# Patient Record
Sex: FEMALE | Race: WHITE | NOT HISPANIC OR LATINO | Employment: UNEMPLOYED | URBAN - METROPOLITAN AREA
[De-identification: names, ages, dates, MRNs, and addresses within clinical notes are randomized per-mention and may not be internally consistent; named-entity substitution may affect disease eponyms.]

---

## 2017-05-26 ENCOUNTER — TELEPHONE (OUTPATIENT)
Dept: OBSTETRICS AND GYNECOLOGY | Facility: CLINIC | Age: 37
End: 2017-05-26

## 2017-05-26 ENCOUNTER — OFFICE VISIT (OUTPATIENT)
Dept: OBSTETRICS AND GYNECOLOGY | Facility: CLINIC | Age: 37
End: 2017-05-26
Attending: OBSTETRICS & GYNECOLOGY
Payer: COMMERCIAL

## 2017-05-26 VITALS
HEIGHT: 65 IN | BODY MASS INDEX: 30.23 KG/M2 | WEIGHT: 181.44 LBS | SYSTOLIC BLOOD PRESSURE: 118 MMHG | DIASTOLIC BLOOD PRESSURE: 78 MMHG

## 2017-05-26 DIAGNOSIS — N76.4 ABSCESS OF VULVA: Primary | ICD-10-CM

## 2017-05-26 PROCEDURE — 99999 PR PBB SHADOW E&M-EST. PATIENT-LVL II: CPT | Mod: PBBFAC,,, | Performed by: OBSTETRICS & GYNECOLOGY

## 2017-05-26 PROCEDURE — 99213 OFFICE O/P EST LOW 20 MIN: CPT | Mod: S$GLB,,, | Performed by: OBSTETRICS & GYNECOLOGY

## 2017-05-26 RX ORDER — SULFAMETHOXAZOLE AND TRIMETHOPRIM 400; 80 MG/1; MG/1
1 TABLET ORAL 2 TIMES DAILY
Qty: 20 TABLET | Refills: 0 | Status: SHIPPED | OUTPATIENT
Start: 2017-05-26 | End: 2017-06-05

## 2017-05-26 NOTE — TELEPHONE ENCOUNTER
----- Message from Nguyen Saleh sent at 5/26/2017 12:55 PM CDT -----  Contact: Patient   x_ 1st Request  _ 2nd Request  _ 3rd Request    Who: YAN WATKINS [[id number]]    Why: Patient is calling in regards to her PCP doctor finding mass developing. Patient states she was advised to be seen by her GYN doctor today. Patient has concerns. Patient is needing to speak with staff.     What Number to Call Back: [phone]    When to Expect a call back: (Before the end of the day)  -- if call after 3:00 call back will be tomorrow.

## 2017-05-26 NOTE — PROGRESS NOTES
Chief Complaint   Patient presents with    Mass     Vulva lump       HPI:  Susan Anaya is a 36 y.o. female patient  who presents today for evaluation of a lump that she noted today while taking a shower.  She describes feeling a slightly tender lump inferior to labia / perianally on right.  No fever.  She has never noted a similar finding in the past.  Patient's last menstrual period was 2017 (exact date).    Past Medical History:   Diagnosis Date    Abnormal Pap smear     Abnormal Pap smear of vagina         Allergy     Asthma     h/o    Celiac disease        Past Surgical History:   Procedure Laterality Date     SECTION      X 2     PELVIC LAPAROSCOPY           ROS:  GENERAL: Feeling well overall.   SKIN: Reports lump near labia.   HEAD: Denies head injury or headache.   NODES: Denies enlarged lymph nodes.   CHEST: Denies chest pain or shortness of breath.   CARDIOVASCULAR: Denies palpitations or left sided chest pain.   ABDOMEN: No abdominal pain, nausea, vomiting or rectal bleeding.   URINARY: No dysuria or hematuria.  REPRODUCTIVE: See HPI.   BREASTS: Denies pain, lumps, or nipple discharge.   HEMATOLOGIC: No easy bruisability or excessive bleeding.   MUSCULOSKELETAL: Denies joint pain or swelling.   NEUROLOGIC: Denies syncope or weakness.   PSYCHIATRIC: Denies depression.    PE:   (chaperone present during entire exam)  APPEARANCE: Well nourished, well developed, in no acute distress.  VULVA: Normal female genitalia.  On right side, inferior no labia / perianally, 2 cm x 2 cm smooth mass deep in subcutaneous tissue- not amendable to I&D at this time.  Minimally tender.  No overlying erythema.        Diagnosis:  1. Abscess of vulva          PLAN:    Orders Placed This Encounter    sulfamethoxazole-trimethoprim 400-80mg (BACTRIM) 400-80 mg per tablet       Patient was counseled today on suspected vulvar / perianal abscess.  At this time, the lesion is deep in the  subcutaneous tissue and is not amendable to I&D.  She will begin Bactrim as well as warm soaks.  She understands that she may require I&D in the future.  Instructions, precautions, and warning signs were reviewed.  She understands need to contact us or go to the ER for worsening symptoms, fever, etc.  She will follow-up in 1 week for reevaluation.    Follow-up 1 week    Total time of visit: 15 minutes (counseling >50% of time)

## 2017-05-31 ENCOUNTER — PROCEDURE VISIT (OUTPATIENT)
Dept: OBSTETRICS AND GYNECOLOGY | Facility: CLINIC | Age: 37
End: 2017-05-31
Payer: COMMERCIAL

## 2017-05-31 ENCOUNTER — TELEPHONE (OUTPATIENT)
Dept: OBSTETRICS AND GYNECOLOGY | Facility: CLINIC | Age: 37
End: 2017-05-31

## 2017-05-31 VITALS
SYSTOLIC BLOOD PRESSURE: 132 MMHG | DIASTOLIC BLOOD PRESSURE: 82 MMHG | BODY MASS INDEX: 30.12 KG/M2 | WEIGHT: 180.75 LBS | HEIGHT: 65 IN

## 2017-05-31 DIAGNOSIS — N76.4 VULVAR ABSCESS: Primary | ICD-10-CM

## 2017-05-31 DIAGNOSIS — K61.0 ABSCESS, PERIANAL: ICD-10-CM

## 2017-05-31 PROCEDURE — 87070 CULTURE OTHR SPECIMN AEROBIC: CPT

## 2017-05-31 PROCEDURE — 10060 I&D ABSCESS SIMPLE/SINGLE: CPT | Mod: S$GLB,,, | Performed by: OBSTETRICS & GYNECOLOGY

## 2017-05-31 NOTE — PROCEDURES
Procedures       Incision and Drainage of Vulvar Abscess    Consent signed    Right vulvar/perineum abscess near perianal area - + induration and fluctuance noted.    Area prepped with betadine solution.    Skin injected with 1ml of 1% xylocaine    A 1 cm incision with scalpel performed over area of fluctuance    + copious pus/purulent fluid drained   Aerobic culture obtained.    1/4 inch gauze placed in abscess bed to facilitate continued drainage.    Local care instructions given by me    ER instructions given.     Patient to continue Bactrim    Follow-up:  2 days for evaluation.    Mac Gomes IV, MD

## 2017-05-31 NOTE — TELEPHONE ENCOUNTER
----- Message from Cecilia Gardner sent at 5/31/2017  8:38 AM CDT -----  Contact: Patient  X _1st Request  _  2nd Request  _  3rd Request    Who:YAN WATKINS [[id number]]    Why:patient states she was seen on last Friday for a infection and she states it has gotten worse and she needs to know what to do     What Number to Call Back:[phone]    When to Expect a call back: (Before the end of the day)   -- if call after 3:00 call back will be tomorrow.

## 2017-06-01 ENCOUNTER — TELEPHONE (OUTPATIENT)
Dept: OBSTETRICS AND GYNECOLOGY | Facility: CLINIC | Age: 37
End: 2017-06-01

## 2017-06-01 NOTE — TELEPHONE ENCOUNTER
Patient states feeling better, reports flu symptoms have subsided. Patient scheduled tomorrow at 11am

## 2017-06-02 ENCOUNTER — OFFICE VISIT (OUTPATIENT)
Dept: OBSTETRICS AND GYNECOLOGY | Facility: CLINIC | Age: 37
End: 2017-06-02
Payer: COMMERCIAL

## 2017-06-02 VITALS
DIASTOLIC BLOOD PRESSURE: 80 MMHG | WEIGHT: 181.69 LBS | HEIGHT: 65 IN | SYSTOLIC BLOOD PRESSURE: 111 MMHG | BODY MASS INDEX: 30.27 KG/M2

## 2017-06-02 DIAGNOSIS — N76.4 VULVAR ABSCESS: Primary | ICD-10-CM

## 2017-06-02 PROCEDURE — 99999 PR PBB SHADOW E&M-EST. PATIENT-LVL II: CPT | Mod: PBBFAC,,, | Performed by: OBSTETRICS & GYNECOLOGY

## 2017-06-02 PROCEDURE — 99024 POSTOP FOLLOW-UP VISIT: CPT | Mod: S$GLB,,, | Performed by: OBSTETRICS & GYNECOLOGY

## 2017-06-02 NOTE — PROGRESS NOTES
CC:   Vulvar abscess    HPI:  Susan Anaya is a 36 y.o. female patient  who presents today for follow-up from I&D of abscess.  Patient reports continued discomfort - but improved from 2017 (I&D date).  Patient denies fever, chills, nausea, or emesis.       Patient's last menstrual period was 2017 (exact date).    Past Medical History:   Diagnosis Date    Abnormal Pap smear     Abnormal Pap smear of vagina         Allergy     Asthma     h/o    Celiac disease        Past Surgical History:   Procedure Laterality Date     SECTION      X 2     PELVIC LAPAROSCOPY           ROS:  GENERAL: Feeling well overall.   SKIN: Denies rash or lesions.   HEAD: Denies head injury or headache.   NODES: Denies enlarged lymph nodes.   CHEST: Denies chest pain or shortness of breath.   CARDIOVASCULAR: Denies palpitations or left sided chest pain.   ABDOMEN: No abdominal pain, nausea, vomiting or rectal bleeding.   URINARY: No dysuria, hematuria, or burning on urination.  REPRODUCTIVE: See HPI.   BREASTS: Denies pain, lumps, or nipple discharge.   HEMATOLOGIC: No easy bruisability or excessive bleeding.   MUSCULOSKELETAL: Denies joint pain or swelling.   NEUROLOGIC: Denies syncope or weakness.   PSYCHIATRIC: Denies depression.    PE:   APPEARANCE: Well nourished, well developed, in no acute distress.  VULVA: No lesions. Normal female genitalia.  Right perineum abscess s/p I&D- 1/ gauze in place.  + drainage of purulent fluid, though minimal. + induration (improved from ).  No erythema (improved from )  URETHRAL MEATUS: Normal size and location, no lesions, no prolapse.  URETHRA: No masses, tenderness, prolapse or scarring.      Diagnosis:  1. Vulvar abscess      PLAN:    1/4 inch gauze removed and replaced.      Local care instructions given    Patient to continue Bactrim x 10 days total    Follow-up: Evaluate on Monday    Mac Gomes IV, MD

## 2017-06-03 LAB — BACTERIA SPEC AEROBE CULT: NORMAL

## 2017-06-05 ENCOUNTER — OFFICE VISIT (OUTPATIENT)
Dept: OBSTETRICS AND GYNECOLOGY | Facility: CLINIC | Age: 37
End: 2017-06-05
Payer: COMMERCIAL

## 2017-06-05 VITALS
WEIGHT: 182.13 LBS | DIASTOLIC BLOOD PRESSURE: 80 MMHG | SYSTOLIC BLOOD PRESSURE: 120 MMHG | HEIGHT: 65 IN | BODY MASS INDEX: 30.34 KG/M2

## 2017-06-05 DIAGNOSIS — N76.4 VULVAR ABSCESS: Primary | ICD-10-CM

## 2017-06-05 PROCEDURE — 99024 POSTOP FOLLOW-UP VISIT: CPT | Mod: S$GLB,,, | Performed by: OBSTETRICS & GYNECOLOGY

## 2017-06-05 PROCEDURE — 99999 PR PBB SHADOW E&M-EST. PATIENT-LVL II: CPT | Mod: PBBFAC,,, | Performed by: OBSTETRICS & GYNECOLOGY

## 2017-06-05 NOTE — PROGRESS NOTES
CC:  Right vulvar abscess    HPI:  Susan Anaya is a 36 y.o. female patient  who presents today for follow-up - I&D of abscess 5 days ago.  Patient has completed bactrim and reports significant improvement in pain.  No fever, chills, nausea, or emesis.  Minimal drainage reported.  The 1/4 inch gauze fell out on Saturday.    Patient's last menstrual period was 2017 (exact date).    Past Medical History:   Diagnosis Date    Abnormal Pap smear     Abnormal Pap smear of vagina         Allergy     Asthma     h/o    Celiac disease        Past Surgical History:   Procedure Laterality Date     SECTION      X 2     PELVIC LAPAROSCOPY           ROS:  GENERAL: Feeling well overall.   SKIN: Denies rash or lesions.   HEAD: Denies head injury or headache.   NODES: Denies enlarged lymph nodes.   CHEST: Denies chest pain or shortness of breath.   CARDIOVASCULAR: Denies palpitations or left sided chest pain.   ABDOMEN: No abdominal pain, nausea, vomiting or rectal bleeding.   URINARY: No dysuria, hematuria, or burning on urination.  REPRODUCTIVE: See HPI.   BREASTS: Denies pain, lumps, or nipple discharge.   HEMATOLOGIC: No easy bruisability or excessive bleeding.   MUSCULOSKELETAL: Denies joint pain or swelling.   NEUROLOGIC: Denies syncope or weakness.   PSYCHIATRIC: Denies depression.    PE:   APPEARANCE: Well nourished, well developed, in no acute distress.  ABDOMEN: Soft. No tenderness or masses. No hernias. No CVA tenderness.  VULVA: Normal female genitalia.  Right perineum/perianal abscess healing well.  Induration significantly improved and no erythema.  No significant drainage today.  URETHRAL MEATUS: Normal size and location, no lesions, no prolapse.  URETHRA: No masses, tenderness, prolapse or scarring.  ANUS PERINEUM: Normal.  See above    Diagnosis:  1. Vulvar abscess          PLAN:    Patient was counseled today on findings.  No new abx erx done    Continue local care    If recurrence  of abscess/pain/erythema noted, patient instructed to contact office.    Follow-up:  Prn/Annual exam  Mca Gomes IV, MD

## 2017-07-09 ENCOUNTER — PATIENT MESSAGE (OUTPATIENT)
Dept: OBSTETRICS AND GYNECOLOGY | Facility: CLINIC | Age: 37
End: 2017-07-09

## 2017-07-10 ENCOUNTER — OFFICE VISIT (OUTPATIENT)
Dept: OBSTETRICS AND GYNECOLOGY | Facility: CLINIC | Age: 37
End: 2017-07-10
Payer: COMMERCIAL

## 2017-07-10 VITALS
WEIGHT: 179.25 LBS | DIASTOLIC BLOOD PRESSURE: 86 MMHG | SYSTOLIC BLOOD PRESSURE: 128 MMHG | HEIGHT: 65 IN | BODY MASS INDEX: 29.87 KG/M2

## 2017-07-10 DIAGNOSIS — N76.4 VULVAR ABSCESS: Primary | ICD-10-CM

## 2017-07-10 PROCEDURE — 99999 PR PBB SHADOW E&M-EST. PATIENT-LVL II: CPT | Mod: PBBFAC,,, | Performed by: NURSE PRACTITIONER

## 2017-07-10 PROCEDURE — 87070 CULTURE OTHR SPECIMN AEROBIC: CPT

## 2017-07-10 PROCEDURE — 99213 OFFICE O/P EST LOW 20 MIN: CPT | Mod: S$GLB,,, | Performed by: NURSE PRACTITIONER

## 2017-07-10 RX ORDER — SULFAMETHOXAZOLE AND TRIMETHOPRIM 800; 160 MG/1; MG/1
1 TABLET ORAL 2 TIMES DAILY
Qty: 20 TABLET | Refills: 0 | Status: SHIPPED | OUTPATIENT
Start: 2017-07-10 | End: 2017-07-20

## 2017-07-10 NOTE — PROGRESS NOTES
"  CC: F/U abscess     HPI: Pt "NIEVES" is a 36 y.o.  female who presents for a f/u vulvar abscess with I&D on 5/3/17.  Reports she finished her antibiotics.   Reports last night she manually drained the abscess as it had refilled with pus.  Denies any associated pain, bleeding or fever.     ROS:  GENERAL: Feeling well overall. Denies fever or chills.   SKIN: Denies rash or lesions.   HEAD: Denies head injury or headache.   NODES: Denies enlarged lymph nodes.   CHEST: Denies chest pain or shortness of breath.   CARDIOVASCULAR: Denies palpitations or left sided chest pain.   ABDOMEN: No abdominal pain, constipation, diarrhea, nausea, vomiting or rectal bleeding.   URINARY: No dysuria, hematuria, or burning on urination.  REPRODUCTIVE: See HPI.   BREASTS: Denies pain, lumps, or nipple discharge.   HEMATOLOGIC: No easy bruisability or excessive bleeding.   MUSCULOSKELETAL: Denies joint pain or swelling.   NEUROLOGIC: Denies syncope or weakness.   PSYCHIATRIC: Denies depression, anxiety or mood swings.  PE:   APPEARANCE: Well nourished, well developed, Black or  female in no acute distress.  VULVA: No lesions. Normal external female genitalia.  Right perineum/perianal abscess with small area of purulent drainage- aerobic culture obtained. No tenderness, erythema, fluctuance, or induration.  URETHRAL MEATUS: Normal size and location, no lesions, no prolapse.  URETHRA: No masses, tenderness, or prolapse.  VAGINA: Moist. No lesions or lacerations noted. No abnormal discharge present. No odor present.   CERVIX: No lesions or discharge. No cervical motion tenderness.   UTERUS: Normal size, regular shape, mobile, non-tender.  ADNEXA: No tenderness. No fullness or masses palpated in the adnexal regions.   ANUS PERINEUM: Normal.    Diagnosis:  1. Vulvar abscess        Plan:     Orders Placed This Encounter    CULTURE, AEROBIC  (SPECIFY SOURCE)    sulfamethoxazole-trimethoprim 800-160mg (BACTRIM DS) 800-160 mg " Tab     Aerobic culture  Bactrim  Discussed to avoid mechanical irritation from tight fighting clothing. Discussed good perineal hygiene.  Discussed topical application of Witch hazel to soothe the skin, reduces pain and swelling, and shrinks the abscesses.   Instructed to notify clinic if experiences a recurrent abscess or fever.      Follow-up PRN no resolution of symptoms    Deepti Recio, AUDRAP-C

## 2017-07-12 LAB — BACTERIA SPEC AEROBE CULT: NORMAL

## 2017-07-23 ENCOUNTER — PATIENT MESSAGE (OUTPATIENT)
Dept: OBSTETRICS AND GYNECOLOGY | Facility: CLINIC | Age: 37
End: 2017-07-23

## 2017-07-25 ENCOUNTER — TELEPHONE (OUTPATIENT)
Dept: OBSTETRICS AND GYNECOLOGY | Facility: CLINIC | Age: 37
End: 2017-07-25

## 2017-07-25 NOTE — TELEPHONE ENCOUNTER
Patient C/O abscess filling again. And wants to know what's the next step. Patient stated that she is going out of town and will be back in own August 5. And wants to know what Dr. Gomes recommend. I advised patient that I will forward message to dr. Gomes. Patient verbalized understanding.

## 2017-07-25 NOTE — TELEPHONE ENCOUNTER
Spoke to patient regarding the abscess she has been dealing with. Patient reports abscess continues to fill and she has been manually draining the abscess. Patient has taken two rounds of Bactrim. Patient reports the area no longer hurts. However, it is not improving. Patient reports she is still doing warm compresses and which tristen. Patient has also tried rubbing alcohol to the affected area. Patient denies fever/chills at this time. Informed patient, will discuss with Dr. Eliseo ROSAS regarding next steps and will follow up. Instructed the patient to contact the office or report to the ER is she begins to experience fever/chills. Patient verbalized understanding.

## 2017-07-25 NOTE — TELEPHONE ENCOUNTER
----- Message from Shreya Kimbrough sent at 7/25/2017 10:45 AM CDT -----  Contact: Self  Pt is calling in regards of her abscess. The pt can be reached at  [phone]. Thanks kG

## 2017-07-25 NOTE — TELEPHONE ENCOUNTER
Informed Susan I consulted with Dr. Eliseo ROSAS. I provided Dr. Eliseo ROSAS with the current status of her abscess. Patient denies fever/chills, abscess is continuing to drain. Dr. Eliseo ROSAS stated drainage at this point is to be expected with the size of the abscess Susan had. Dr. Eliseo ROSAS wants Susan to stop manually draining the abscess and let it drain on its own so it can heal from the inside out. Dr. Eliseo ROSAS wants Susan to continue with warm compresses and witch hazel. Instructed Susan to continue monitoring for the next 2-3 days without draining the abscess and to call the office if she begins to have discomfort/pain, fever, or chills. Patient verbalized understanding.

## 2017-08-01 ENCOUNTER — TELEPHONE (OUTPATIENT)
Dept: OBSTETRICS AND GYNECOLOGY | Facility: CLINIC | Age: 37
End: 2017-08-01

## 2017-08-01 NOTE — TELEPHONE ENCOUNTER
----- Message from Alea Lord sent at 8/1/2017  4:13 PM CDT -----  Contact: YAN WATKINS [[id number]]  x_  1st Request  _  2nd Request  _  3rd Request        Who: YAN WATKINS [[id number]]    Why: Patient states she would like a call back in regards to her cyst turning into a fistula and would advise on what to do    What Number to Call Back: [phone]    When to Expect a call back: (Before the end of the day)   -- if call after 3:00 call back will be tomorrow.

## 2017-08-01 NOTE — TELEPHONE ENCOUNTER
Patient reports when pressed down on bump, and d/c came out of anus. Patient currently out of town, will check with Dr. Gomes if needs sooner appointment, then 8/8/2017 for evaluation. Informed will check with patient tomorrow regarding condition

## 2017-08-02 NOTE — TELEPHONE ENCOUNTER
----- Message from Alea Lord sent at 8/2/2017 12:26 PM CDT -----  Contact: YAN WATKINS [[id number]]  x_  1st Request  _  2nd Request  _  3rd Request        Who: YAN WATKINS [[id number]]    Why: patient states she would like to speak with Arlene again in regards to her abscess     What Number to Call Back: [phone]    When to Expect a call back: (Before the end of the day)   -- if call after 3:00 call back will be tomorrow.

## 2017-08-08 ENCOUNTER — OFFICE VISIT (OUTPATIENT)
Dept: OBSTETRICS AND GYNECOLOGY | Facility: CLINIC | Age: 37
End: 2017-08-08
Payer: COMMERCIAL

## 2017-08-08 VITALS
HEIGHT: 65 IN | DIASTOLIC BLOOD PRESSURE: 90 MMHG | BODY MASS INDEX: 30.12 KG/M2 | WEIGHT: 180.75 LBS | SYSTOLIC BLOOD PRESSURE: 122 MMHG

## 2017-08-08 DIAGNOSIS — N76.4 VULVAR ABSCESS: Primary | ICD-10-CM

## 2017-08-08 PROCEDURE — 99213 OFFICE O/P EST LOW 20 MIN: CPT | Mod: S$GLB,,, | Performed by: OBSTETRICS & GYNECOLOGY

## 2017-08-08 PROCEDURE — 99999 PR PBB SHADOW E&M-EST. PATIENT-LVL II: CPT | Mod: PBBFAC,,, | Performed by: OBSTETRICS & GYNECOLOGY

## 2017-08-08 PROCEDURE — 87186 SC STD MICRODIL/AGAR DIL: CPT

## 2017-08-08 PROCEDURE — 87070 CULTURE OTHR SPECIMN AEROBIC: CPT

## 2017-08-08 PROCEDURE — 3008F BODY MASS INDEX DOCD: CPT | Mod: S$GLB,,, | Performed by: OBSTETRICS & GYNECOLOGY

## 2017-08-08 PROCEDURE — 87077 CULTURE AEROBIC IDENTIFY: CPT

## 2017-08-08 NOTE — PROGRESS NOTES
"HPI:  Abscess drainage    HPI:.Susan Anaya is a 37 y.o. female patient  who presents today with continued drainage from an abscess that was drained 2017.  Patient reports continued swelling and collection of purulent material - drains after compression.  The fluid then collects again with no decrease in output. Patient denies significant discomfort, fever, or chills.      Patient does report possible drainage from "rectum" a week ago x 1.  Patient is unclear if this is so but has read about fistulae and is concerned.  No rectal drainage reported over past week.    Patient's last menstrual period was 2017.    Past Medical History:   Diagnosis Date    Abnormal Pap smear     Abnormal Pap smear of vagina         Allergy     Asthma     h/o    Celiac disease        Past Surgical History:   Procedure Laterality Date     SECTION      X 2     PELVIC LAPAROSCOPY           ROS:  GENERAL: Feeling well overall.   SKIN: Denies rash or lesions.   HEAD: Denies head injury or headache.   NODES: Denies enlarged lymph nodes.   CHEST: Denies chest pain or shortness of breath.   CARDIOVASCULAR: Denies palpitations or left sided chest pain.   ABDOMEN: No abdominal pain, nausea, vomiting or rectal bleeding.   URINARY: No dysuria, hematuria, or burning on urination.  REPRODUCTIVE: See HPI.   BREASTS: Denies pain, lumps, or nipple discharge.   HEMATOLOGIC: No easy bruisability or excessive bleeding.   MUSCULOSKELETAL: Denies joint pain or swelling.   NEUROLOGIC: Denies syncope or weakness.   PSYCHIATRIC: Denies depression.    PE:   APPEARANCE: Well nourished, well developed, in no acute distress.  ABDOMEN: Soft. No tenderness or masses. No hernias. No CVA tenderness.  VULVA: Normal female genitalia.  Right perineum opening with purulent drainage today.  No pain and minimal induration on palpation.  URETHRAL MEATUS: Normal size and location, no lesions, no prolapse.  URETHRA: No masses, tenderness, " prolapse or scarring.  VAGINA: Moist and well rugated, no discharge, no significant cystocele or rectocele.  CERVIX: No lesions and discharge.   UTERUS: Normal size, regular shape, mobile, non-tender, bladder base nontender.  ADNEXA: No masses, tenderness or CDS nodularity.  ANUS/RECTAL:  No extension of induration or abnormality noted on rectal exam.    Diagnosis:  1. Vulvar abscess - chronic          PLAN:    Culture of abscess done.    Patient was counseled today on findings.  Consents were signed for repair I&D, resection/packing of area in OR    Discussed possible CRS follow-up if no resolution    OR to be scheduled over next month    Mac Gomes IV, MD

## 2017-08-09 ENCOUNTER — TELEPHONE (OUTPATIENT)
Dept: OBSTETRICS AND GYNECOLOGY | Facility: CLINIC | Age: 37
End: 2017-08-09

## 2017-08-09 DIAGNOSIS — N76.4 VULVAR ABSCESS: Primary | ICD-10-CM

## 2017-08-12 LAB
BACTERIA SPEC AEROBE CULT: NORMAL
BACTERIA SPEC AEROBE CULT: NORMAL

## 2017-08-17 ENCOUNTER — HOSPITAL ENCOUNTER (OUTPATIENT)
Dept: PREADMISSION TESTING | Facility: OTHER | Age: 37
Discharge: HOME | End: 2017-08-17
Attending: OBSTETRICS & GYNECOLOGY
Payer: COMMERCIAL

## 2017-08-17 ENCOUNTER — ANESTHESIA EVENT (OUTPATIENT)
Dept: SURGERY | Facility: OTHER | Age: 37
End: 2017-08-17
Payer: COMMERCIAL

## 2017-08-17 VITALS
TEMPERATURE: 99 F | HEIGHT: 65 IN | SYSTOLIC BLOOD PRESSURE: 134 MMHG | OXYGEN SATURATION: 100 % | DIASTOLIC BLOOD PRESSURE: 74 MMHG | BODY MASS INDEX: 29.49 KG/M2 | WEIGHT: 177 LBS | HEART RATE: 62 BPM

## 2017-08-17 RX ORDER — SCOLOPAMINE TRANSDERMAL SYSTEM 1 MG/1
1 PATCH, EXTENDED RELEASE TRANSDERMAL
Status: CANCELLED | OUTPATIENT
Start: 2017-08-17

## 2017-08-17 RX ORDER — SODIUM CHLORIDE, SODIUM LACTATE, POTASSIUM CHLORIDE, CALCIUM CHLORIDE 600; 310; 30; 20 MG/100ML; MG/100ML; MG/100ML; MG/100ML
INJECTION, SOLUTION INTRAVENOUS CONTINUOUS
Status: CANCELLED | OUTPATIENT
Start: 2017-08-17

## 2017-08-17 RX ORDER — MIDAZOLAM HYDROCHLORIDE 5 MG/ML
5 INJECTION INTRAMUSCULAR; INTRAVENOUS
Status: CANCELLED | OUTPATIENT
Start: 2017-08-17 | End: 2017-08-17

## 2017-08-17 RX ORDER — ACETAMINOPHEN 325 MG/1
325 TABLET ORAL EVERY 6 HOURS PRN
COMMUNITY
End: 2017-10-30

## 2017-08-17 NOTE — ANESTHESIA PREPROCEDURE EVALUATION
08/17/2017  Susan Anaya is a 37 y.o., female.    Anesthesia Evaluation    I have reviewed the Patient Summary Reports.    I have reviewed the Nursing Notes.   I have reviewed the Medications.     Review of Systems  Anesthesia Hx:  No problems with previous Anesthesia  Denies Family Hx of Anesthesia complications.  Personal Hx of Anesthesia complications, Post-Operative Nausea/Vomiting   Social:  Non-Smoker    Hematology/Oncology:  Hematology Normal   Oncology Normal     EENT/Dental:EENT/Dental Normal   Cardiovascular:  Cardiovascular Normal Exercise tolerance: good     Pulmonary:  Pulmonary Normal    Renal/:  Renal/ Normal     Musculoskeletal:  Musculoskeletal Normal    Neurological:  Neurology Normal    Endocrine:  Endocrine Normal    Dermatological:  Skin Normal    Psych:  Psychiatric Normal           Physical Exam  General:  Well nourished    Airway/Jaw/Neck:  Airway Findings: Mouth Opening: Normal Tongue: Normal  General Airway Assessment: Adult  Mallampati: I  TM Distance: Normal, at least 6 cm  Jaw/Neck Findings:  Neck ROM: Normal ROM      Dental:  Dental Findings: In tact             Anesthesia Plan  Type of Anesthesia, risks & benefits discussed:  Anesthesia Type:  general, MAC  Patient's Preference:   Intra-op Monitoring Plan:   Intra-op Monitoring Plan Comments:   Post Op Pain Control Plan:   Post Op Pain Control Plan Comments:   Induction:   IV  Beta Blocker:         Informed Consent: Patient understands risks and agrees with Anesthesia plan.  Questions answered. Anesthesia consent signed with patient.  ASA Score: 1     Day of Surgery Review of History & Physical:    H&P update referred to the surgeon.         Ready For Surgery From Anesthesia Perspective.

## 2017-08-17 NOTE — DISCHARGE INSTRUCTIONS
PRE-ADMIT TESTING -  [phone]    [address]  [facility]        OUTPATIENT SURGERY UNIT - [phone]    Your surgery has been scheduled at [facility]. We are pleased to have the opportunity to serve you. For Further Information please call [phone].    On the day of surgery please report to the Information Desk on the 1st floor.    · CONTACT YOUR PHYSICIAN'S OFFICE THE DAY PRIOR TO YOUR SURGERY TO OBTAIN YOUR ARRIVAL TIME.     · The evening before surgery do not eat anything after 9 p.m. ( this includes hard candy, chewing gum and mints).  You may only have GATORADE, POWERADE AND WATER  from 9 p.m. until you leave your home.   DO NOT DRINK ANY LIQUIDS ON THE WAY TO THE HOSPITAL.      SPECIAL MEDICATION INSTRUCTIONS: TAKE medications checked off by the Anesthesiologist on your Medication List.    Angiogram Patients: Take medications as instructed by your physician, including aspirin.     Surgery Patients:    If you take ASPIRIN - Your PHYSICIAN/SURGEON will need to inform you IF/OR when you need to stop taking aspirin prior to your surgery.     Do Not take any medications containing IBUPROFEN.  Do Not Wear any make-up or dark nail polish   (especially eye make-up) to surgery. If you come to surgery with makeup on you will be required to remove the makeup or nail polish.    Do not shave your surgical area at least 5 days prior to your surgery. The surgical prep will be performed at the hospital according to Infection Control regulations.    Leave all valuables at home.   Do Not wear any jewelry or watches, including any metal in body piercings.  Contact Lens must be removed before surgery. Either do not wear the contact lens or bring a case and solution for storage.  Please bring a container for eyeglasses or dentures as required.  Bring any paperwork your physician has provided, such as consent forms,  history and physicals, doctor's orders, etc.   Bring comfortable clothes  that are loose fitting to wear upon discharge. Take into consideration the type of surgery being performed.  Maintain your diet as advised per your physician the day prior to surgery.      Adequate rest the night before surgery is advised.   Park in the Parking lot behind the hospital or in the [facility] across the street from the parking lot. Parking is complimentary.  If you will be discharged the same day as your procedure, please arrange for a responsible adult to drive you home or to accompany you if traveling by taxi.   YOU WILL NOT BE PERMITTED TO DRIVE OR TO LEAVE THE HOSPITAL ALONE AFTER SURGERY.   It is strongly recommended that you arrange for someone to remain with you for the first 24 hrs following your surgery.       Thank you for your cooperation.  The Staff of [facility].        Bathing Instructions                                                                 Please shower the evening before and morning of your procedure with    ANTIBACTERIAL SOAP. ( DIAL, etc )  Concentrate on the surgical area   for at least 3 minutes and rinse completely. Dry off as usual.   Do not use any deodorant, powder, body lotions, perfume, after shave or    cologne.

## 2017-08-21 ENCOUNTER — HOSPITAL ENCOUNTER (OUTPATIENT)
Facility: OTHER | Age: 37
Discharge: HOME | End: 2017-08-21
Attending: OBSTETRICS & GYNECOLOGY | Admitting: OBSTETRICS & GYNECOLOGY
Payer: COMMERCIAL

## 2017-08-21 ENCOUNTER — ANESTHESIA (OUTPATIENT)
Dept: SURGERY | Facility: OTHER | Age: 37
End: 2017-08-21
Payer: COMMERCIAL

## 2017-08-21 VITALS
DIASTOLIC BLOOD PRESSURE: 78 MMHG | HEIGHT: 65 IN | BODY MASS INDEX: 29.49 KG/M2 | HEART RATE: 64 BPM | OXYGEN SATURATION: 100 % | TEMPERATURE: 98 F | SYSTOLIC BLOOD PRESSURE: 119 MMHG | RESPIRATION RATE: 16 BRPM | WEIGHT: 177 LBS

## 2017-08-21 DIAGNOSIS — Z98.890 STATUS POST INCISION AND DRAINAGE: Primary | ICD-10-CM

## 2017-08-21 DIAGNOSIS — N76.4 VULVAR ABSCESS: ICD-10-CM

## 2017-08-21 LAB
B-HCG UR QL: NEGATIVE
CTP QC/QA: YES

## 2017-08-21 PROCEDURE — 88304 TISSUE EXAM BY PATHOLOGIST: CPT | Mod: 26,,, | Performed by: PATHOLOGY

## 2017-08-21 PROCEDURE — 71000016 HC POSTOP RECOV ADDL HR: Performed by: OBSTETRICS & GYNECOLOGY

## 2017-08-21 PROCEDURE — 63600175 PHARM REV CODE 636 W HCPCS: Performed by: OBSTETRICS & GYNECOLOGY

## 2017-08-21 PROCEDURE — C9290 INJ, BUPIVACAINE LIPOSOME: HCPCS | Performed by: OBSTETRICS & GYNECOLOGY

## 2017-08-21 PROCEDURE — 63600175 PHARM REV CODE 636 W HCPCS: Performed by: ANESTHESIOLOGY

## 2017-08-21 PROCEDURE — 63600175 PHARM REV CODE 636 W HCPCS: Performed by: NURSE ANESTHETIST, CERTIFIED REGISTERED

## 2017-08-21 PROCEDURE — 36000704 HC OR TIME LEV I 1ST 15 MIN: Performed by: OBSTETRICS & GYNECOLOGY

## 2017-08-21 PROCEDURE — 25000003 PHARM REV CODE 250: Performed by: ANESTHESIOLOGY

## 2017-08-21 PROCEDURE — 71000015 HC POSTOP RECOV 1ST HR: Performed by: OBSTETRICS & GYNECOLOGY

## 2017-08-21 PROCEDURE — 36000705 HC OR TIME LEV I EA ADD 15 MIN: Performed by: OBSTETRICS & GYNECOLOGY

## 2017-08-21 PROCEDURE — 56405 I&D VULVA/PERINEAL ABSCESS: CPT | Mod: ,,, | Performed by: OBSTETRICS & GYNECOLOGY

## 2017-08-21 PROCEDURE — 88304 TISSUE EXAM BY PATHOLOGIST: CPT | Performed by: PATHOLOGY

## 2017-08-21 PROCEDURE — 81025 URINE PREGNANCY TEST: CPT | Performed by: ANESTHESIOLOGY

## 2017-08-21 PROCEDURE — 37000008 HC ANESTHESIA 1ST 15 MINUTES: Performed by: OBSTETRICS & GYNECOLOGY

## 2017-08-21 PROCEDURE — 71000033 HC RECOVERY, INTIAL HOUR: Performed by: OBSTETRICS & GYNECOLOGY

## 2017-08-21 PROCEDURE — 37000009 HC ANESTHESIA EA ADD 15 MINS: Performed by: OBSTETRICS & GYNECOLOGY

## 2017-08-21 RX ORDER — SODIUM CHLORIDE 0.9 % (FLUSH) 0.9 %
3 SYRINGE (ML) INJECTION
Status: DISCONTINUED | OUTPATIENT
Start: 2017-08-21 | End: 2017-08-21 | Stop reason: HOSPADM

## 2017-08-21 RX ORDER — ONDANSETRON 8 MG/1
8 TABLET, ORALLY DISINTEGRATING ORAL EVERY 8 HOURS PRN
Qty: 20 TABLET | Refills: 0 | Status: SHIPPED | OUTPATIENT
Start: 2017-08-21 | End: 2017-09-19

## 2017-08-21 RX ORDER — MEPERIDINE HYDROCHLORIDE 50 MG/ML
12.5 INJECTION INTRAMUSCULAR; INTRAVENOUS; SUBCUTANEOUS ONCE AS NEEDED
Status: DISCONTINUED | OUTPATIENT
Start: 2017-08-21 | End: 2017-08-21 | Stop reason: HOSPADM

## 2017-08-21 RX ORDER — DIPHENHYDRAMINE HCL 25 MG
25 CAPSULE ORAL EVERY 4 HOURS PRN
Status: DISCONTINUED | OUTPATIENT
Start: 2017-08-21 | End: 2017-08-21 | Stop reason: HOSPADM

## 2017-08-21 RX ORDER — DIPHENHYDRAMINE HYDROCHLORIDE 50 MG/ML
25 INJECTION INTRAMUSCULAR; INTRAVENOUS EVERY 4 HOURS PRN
Status: DISCONTINUED | OUTPATIENT
Start: 2017-08-21 | End: 2017-08-21 | Stop reason: HOSPADM

## 2017-08-21 RX ORDER — SULFAMETHOXAZOLE AND TRIMETHOPRIM 800; 160 MG/1; MG/1
1 TABLET ORAL 2 TIMES DAILY
Qty: 20 TABLET | Refills: 0 | Status: SHIPPED | OUTPATIENT
Start: 2017-08-21 | End: 2017-08-31

## 2017-08-21 RX ORDER — PROMETHAZINE HYDROCHLORIDE 25 MG/1
25 TABLET ORAL EVERY 6 HOURS PRN
Status: DISCONTINUED | OUTPATIENT
Start: 2017-08-21 | End: 2017-08-21 | Stop reason: HOSPADM

## 2017-08-21 RX ORDER — LIDOCAINE HCL/PF 100 MG/5ML
SYRINGE (ML) INTRAVENOUS
Status: DISCONTINUED | OUTPATIENT
Start: 2017-08-21 | End: 2017-08-21

## 2017-08-21 RX ORDER — SCOLOPAMINE TRANSDERMAL SYSTEM 1 MG/1
1 PATCH, EXTENDED RELEASE TRANSDERMAL
Status: DISCONTINUED | OUTPATIENT
Start: 2017-08-21 | End: 2017-08-21 | Stop reason: HOSPADM

## 2017-08-21 RX ORDER — SODIUM CHLORIDE, SODIUM LACTATE, POTASSIUM CHLORIDE, CALCIUM CHLORIDE 600; 310; 30; 20 MG/100ML; MG/100ML; MG/100ML; MG/100ML
INJECTION, SOLUTION INTRAVENOUS CONTINUOUS
Status: DISCONTINUED | OUTPATIENT
Start: 2017-08-21 | End: 2017-08-21 | Stop reason: HOSPADM

## 2017-08-21 RX ORDER — FENTANYL CITRATE 50 UG/ML
INJECTION, SOLUTION INTRAMUSCULAR; INTRAVENOUS
Status: DISCONTINUED | OUTPATIENT
Start: 2017-08-21 | End: 2017-08-21

## 2017-08-21 RX ORDER — OXYCODONE HYDROCHLORIDE 5 MG/1
5 TABLET ORAL ONCE
Status: COMPLETED | OUTPATIENT
Start: 2017-08-21 | End: 2017-08-21

## 2017-08-21 RX ORDER — FENTANYL CITRATE 50 UG/ML
25 INJECTION, SOLUTION INTRAMUSCULAR; INTRAVENOUS EVERY 5 MIN PRN
Status: DISCONTINUED | OUTPATIENT
Start: 2017-08-21 | End: 2017-08-21 | Stop reason: HOSPADM

## 2017-08-21 RX ORDER — HYDROMORPHONE HYDROCHLORIDE 2 MG/ML
0.4 INJECTION, SOLUTION INTRAMUSCULAR; INTRAVENOUS; SUBCUTANEOUS EVERY 5 MIN PRN
Status: DISCONTINUED | OUTPATIENT
Start: 2017-08-21 | End: 2017-08-21 | Stop reason: HOSPADM

## 2017-08-21 RX ORDER — PROPOFOL 10 MG/ML
VIAL (ML) INTRAVENOUS
Status: DISCONTINUED | OUTPATIENT
Start: 2017-08-21 | End: 2017-08-21

## 2017-08-21 RX ORDER — IBUPROFEN 600 MG/1
600 TABLET ORAL EVERY 6 HOURS PRN
Qty: 30 TABLET | Refills: 1 | Status: SHIPPED | OUTPATIENT
Start: 2017-08-21 | End: 2017-09-19

## 2017-08-21 RX ORDER — ACETAMINOPHEN 10 MG/ML
1000 INJECTION, SOLUTION INTRAVENOUS
Status: COMPLETED | OUTPATIENT
Start: 2017-08-21 | End: 2017-08-21

## 2017-08-21 RX ORDER — OXYCODONE HYDROCHLORIDE 5 MG/1
5 TABLET ORAL
Status: DISCONTINUED | OUTPATIENT
Start: 2017-08-21 | End: 2017-08-21 | Stop reason: HOSPADM

## 2017-08-21 RX ORDER — ONDANSETRON 2 MG/ML
INJECTION INTRAMUSCULAR; INTRAVENOUS
Status: DISCONTINUED | OUTPATIENT
Start: 2017-08-21 | End: 2017-08-21

## 2017-08-21 RX ORDER — OXYCODONE AND ACETAMINOPHEN 5; 325 MG/1; MG/1
1 TABLET ORAL EVERY 4 HOURS PRN
Qty: 25 TABLET | Refills: 0 | Status: SHIPPED | OUTPATIENT
Start: 2017-08-21 | End: 2017-09-19

## 2017-08-21 RX ORDER — MIDAZOLAM HYDROCHLORIDE 5 MG/ML
5 INJECTION INTRAMUSCULAR; INTRAVENOUS
Status: COMPLETED | OUTPATIENT
Start: 2017-08-21 | End: 2017-08-21

## 2017-08-21 RX ORDER — ONDANSETRON 8 MG/1
8 TABLET, ORALLY DISINTEGRATING ORAL EVERY 8 HOURS PRN
Status: DISCONTINUED | OUTPATIENT
Start: 2017-08-21 | End: 2017-08-21 | Stop reason: HOSPADM

## 2017-08-21 RX ORDER — KETOROLAC TROMETHAMINE 30 MG/ML
INJECTION, SOLUTION INTRAMUSCULAR; INTRAVENOUS
Status: DISCONTINUED | OUTPATIENT
Start: 2017-08-21 | End: 2017-08-21

## 2017-08-21 RX ORDER — MIDAZOLAM HYDROCHLORIDE 1 MG/ML
INJECTION INTRAMUSCULAR; INTRAVENOUS
Status: DISCONTINUED | OUTPATIENT
Start: 2017-08-21 | End: 2017-08-21

## 2017-08-21 RX ORDER — IBUPROFEN 600 MG/1
600 TABLET ORAL EVERY 6 HOURS PRN
Status: DISCONTINUED | OUTPATIENT
Start: 2017-08-21 | End: 2017-08-21 | Stop reason: HOSPADM

## 2017-08-21 RX ORDER — HYDROCODONE BITARTRATE AND ACETAMINOPHEN 5; 325 MG/1; MG/1
1 TABLET ORAL EVERY 4 HOURS PRN
Status: DISCONTINUED | OUTPATIENT
Start: 2017-08-21 | End: 2017-08-21 | Stop reason: HOSPADM

## 2017-08-21 RX ADMIN — FENTANYL CITRATE 100 MCG: 50 INJECTION, SOLUTION INTRAMUSCULAR; INTRAVENOUS at 08:08

## 2017-08-21 RX ADMIN — LIDOCAINE HYDROCHLORIDE 100 MG: 20 INJECTION, SOLUTION INTRAVENOUS at 08:08

## 2017-08-21 RX ADMIN — ACETAMINOPHEN 1000 MG: 10 INJECTION, SOLUTION INTRAVENOUS at 08:08

## 2017-08-21 RX ADMIN — OXYCODONE HYDROCHLORIDE 5 MG: 5 TABLET ORAL at 09:08

## 2017-08-21 RX ADMIN — OXYCODONE HYDROCHLORIDE 5 MG: 5 TABLET ORAL at 11:08

## 2017-08-21 RX ADMIN — MIDAZOLAM HYDROCHLORIDE 2 MG: 1 INJECTION, SOLUTION INTRAMUSCULAR; INTRAVENOUS at 08:08

## 2017-08-21 RX ADMIN — KETOROLAC TROMETHAMINE 30 MG: 30 INJECTION, SOLUTION INTRAMUSCULAR; INTRAVENOUS at 08:08

## 2017-08-21 RX ADMIN — MIDAZOLAM HYDROCHLORIDE 5 MG: 5 INJECTION, SOLUTION INTRAMUSCULAR; INTRAVENOUS at 06:08

## 2017-08-21 RX ADMIN — ONDANSETRON 4 MG: 2 INJECTION INTRAMUSCULAR; INTRAVENOUS at 08:08

## 2017-08-21 RX ADMIN — SCOPALAMINE 1 PATCH: 1 PATCH, EXTENDED RELEASE TRANSDERMAL at 06:08

## 2017-08-21 RX ADMIN — SODIUM CHLORIDE, SODIUM LACTATE, POTASSIUM CHLORIDE, AND CALCIUM CHLORIDE: 600; 310; 30; 20 INJECTION, SOLUTION INTRAVENOUS at 07:08

## 2017-08-21 RX ADMIN — PROMETHAZINE HYDROCHLORIDE 6.25 MG: 25 INJECTION INTRAMUSCULAR; INTRAVENOUS at 11:08

## 2017-08-21 RX ADMIN — PROPOFOL 150 MG: 10 INJECTION, EMULSION INTRAVENOUS at 08:08

## 2017-08-21 NOTE — PLAN OF CARE
Patient prefers to have [facility] present for discharge teaching. Please contact them @[phone].

## 2017-08-21 NOTE — TRANSFER OF CARE
"Anesthesia Transfer of Care Note    Patient: Susan Anaya    Procedure(s) Performed: Procedure(s) (LRB):  INCISION-VULVA (N/A)    Patient location: PACU    Anesthesia Type: general    Transport from OR: Transported from OR on 2-3 L/min O2 by NC with adequate spontaneous ventilation    Post pain: adequate analgesia    Post assessment: no apparent anesthetic complications    Post vital signs: stable    Level of consciousness: awake, alert and oriented    Nausea/Vomiting: no nausea/vomiting    Complications: none    Transfer of care protocol was followed      Last vitals:   Visit Vitals  /82 (Patient Position: Sitting)   Pulse 65   Temp 36.7 °C (98 °F) (Oral)   Resp 16   Ht 5' 5" (1.651 m)   Wt 80.3 kg (177 lb)   LMP 07/27/2017   SpO2 99%   BMI 29.45 kg/m²     "

## 2017-08-21 NOTE — DISCHARGE SUMMARY
[facility]  Brief Op Note/Discharge Note  Short Stay    Admit Date: 8/21/2017    Discharge Date: 08/21/2017    Attending Physician: Mac Gomes IV, MD     Surgery Date: 8/21/2017     Surgeon(s) and Role:     * Mac Gomes IV, MD - Primary    Assisting Surgeon: Kike Johnson MD PGY1    Pre-op Diagnosis:  Vulvar abscess [N76.4]    Post-op Diagnosis:  Post-Op Diagnosis Codes:     * Vulvar abscess [N76.4]    Procedure(s) (LRB):  INCISION-VULVA (N/A)    Anesthesia: General/MAC    Findings/Key Components:     PLEASE SEE OP NOTE FOR FURTHER DETAIL    --EUA showed small right perineal abscess with no connection to vagina or rectum  --Abscess was incised and explored, found to track about 1.5 centimeters towards anal canal  --Abscess track was excised  --Track was closed with multiple simple interrupted and figure-of-eight stitches of 2-0 and 3-0 vicryl  --Skin was closed with mattress stitches of 4-0 vicryl  --12 cc exparel injected around incision site  --Multiple exams throughout the procedure showed no evidence of fistula formation      Estimated Blood Loss: 50 mL         Specimens:   Specimen (12h ago through future)    Start     Ordered    08/21/17 0910  Specimen to Pathology - Surgery  Once     Comments:  1. PERINEAL ABSCESS TRACK      08/21/17 0915          Discharge Provider: Kike Johnson    Diagnoses:  Active Hospital Problems    Diagnosis  POA    *Vulvar abscess [N76.4]  Yes    Status post incision and drainage [Z98.890]  Not Applicable      Resolved Hospital Problems    Diagnosis Date Resolved POA   No resolved problems to display.       Discharged Condition: good    Hospital Course:   Patient was admitted for outpatient procedure as above, and tolerated the procedure well with no complications. Please see operative report for further details. Following the procedure, the patient was awakened from anesthesia and transferred to the recovery area in stable condition. She was discharged to  home once ambulating, voiding, tolerating PO intake, and pain was well-controlled. Patient was given routine post-op instructions and prescriptions for pain medication to take as needed. Patient instructed to follow up with Dr. Gomes in 1 week.    Final Diagnoses: Same as principal problem. S/p I&D    Disposition: Home or Self Care    Follow up/Patient Instructions:    Medications:  Reconciled Home Medications:   Current Discharge Medication List      START taking these medications    Details   ibuprofen (ADVIL,MOTRIN) 600 MG tablet Take 1 tablet (600 mg total) by mouth every 6 (six) hours as needed for Pain.  Qty: 30 tablet, Refills: 1      ondansetron (ZOFRAN-ODT) 8 MG TbDL Take 1 tablet (8 mg total) by mouth every 8 (eight) hours as needed (nausea/vomiting).  Qty: 20 tablet, Refills: 0      oxycodone-acetaminophen (PERCOCET) 5-325 mg per tablet Take 1 tablet by mouth every 4 (four) hours as needed for Pain.  Qty: 25 tablet, Refills: 0      sulfamethoxazole-trimethoprim 800-160mg (BACTRIM DS) 800-160 mg Tab Take 1 tablet by mouth 2 (two) times daily.  Qty: 20 tablet, Refills: 0         CONTINUE these medications which have NOT CHANGED    Details   acetaminophen (TYLENOL) 325 MG tablet Take 325 mg by mouth every 6 (six) hours as needed for Pain.      MULTIVIT WITH CALCIUM,IRON,MIN (WOMEN'S MULTIPLE VITAMINS ORAL) Take by mouth.      lorazepam (ATIVAN) 0.5 MG tablet Take 1 tablet (0.5 mg total) by mouth daily as needed for Anxiety.  Qty: 15 tablet, Refills: 0             Discharge Procedure Orders  Diet general     Activity as tolerated     Other restrictions (specify):   Order Comments: --No baths/swimming pools/hot tubs until post op visit  --Nothing in the vagina until post op visit     Call MD for:  temperature >100.4     Call MD for:  persistent nausea and vomiting     Call MD for:  severe uncontrolled pain     Call MD for:  difficulty breathing, headache or visual disturbances     Call MD for:  redness,  tenderness, or signs of infection (pain, swelling, redness, odor or green/yellow discharge around incision site)     Call MD for:  hives     Call MD for:  persistent dizziness or light-headedness     Call MD for:  extreme fatigue     No dressing needed       Follow-up Information     Mac Gomes Iv, MD. Schedule an appointment as soon as possible for a visit in 1 week.    Specialties:  Obstetrics, Obstetrics and Gynecology  Why:  Post Op Check  Contact information:  [address] [address]  [phone]                   Kike Johnson MD  PGY1, Obstetrics & Gynecology  [facility]

## 2017-08-21 NOTE — OP NOTE
OPERATIVE NOTE    DATE OF PROCEDURE: 08/21/2017    SURGEON: Dr. Mac Gomes IV, MD    ASSISTANT: Kike Johnson MD PGY1    PREOPERATIVE DIAGNOSIS:   1. Perineal abscess    POSTOPERATIVE DIAGNOSIS:   1. Same as above   2. S/P Incision of perineal abscess     PROCEDURE: Incision of perineal abscess    ANESTHESIA: general    FINDINGS: Right perianal abscess with 1.5 cm abscess tract towards anal canal. No fistula observed.    ESTIMATED BLOOD LOSS: 50 ml    URINE OUTPUT: 300 mL drained via straight catheterization prior to procedure     IV FLUIDS: 600 mL.     SPECIMENS: perineal abscess tract    PROCEDURE IN DETAIL:     Patient was taken to the operating room where general anesthesia was administered and found to be adequate.  She was placed in the dorsal lithotomy position using Jossue stirrups, then prepped and draped in the usual sterile fashion. Bladder was drained via straight catheterization prior to procedure.  A surgical timeout was performed with patient's name, date of birth, procedure to be performed, and allergies verbalized. All OR staff in agreement. No preoperative antibiotics were administered as none were indicated.    Abscess was identified on right perineal area, at about 10 o-clock in relation to the anus. Bimanual and rectal exams under anesthesia showed no evidence of fistula into the vagina or rectum. A linear incision was made over the abscess. Minimal purulent discharge expelled. The abscess was explored thoroughly and found to track about 1.5 cm towards the anal canal. The abscess tract was identified and excised. The specimen was sent to pathology. Bovie cautery was used to obtain adequate hemostasis. The tract was closed with multiple stitches of 2-0 and 3-0 vicryl in a simple interrupted fashion. The skin was reapproximated with 4 mattress stitches of 4-0 vicryl. Multiple exams throughout procedure confirmed no evidence of fistula formation. 12 ccs of exparel were injected around the incision  site at the conclusion of the case.    Sponge and instrument counts were correct x 2. The patient tolerated the procedure well and was awakened without difficulty. She was taken to the recovery room in stable condition.     Kike Johnson MD  PGY1, Obstetrics & Gynecology  08/21/2017 10:22 AM     Mac Gomes IV, MD

## 2017-08-21 NOTE — INTERVAL H&P NOTE
The patient has been examined and the H&P has been reviewed:    I concur with the findings and no changes in the patient's medical history have occurred since H&P was written. Pt feels well today, does report that the abscess spontaneously drained yesterday. Pt reports that prior to the past 1-2 weeks, fluid accumulation was more lateral - now more fluid drainage occurs with upward pressure on the rectum.               Active Hospital Problems    Diagnosis  POA    Vulvar abscess [N76.4]  Yes      Resolved Hospital Problems    Diagnosis Date Resolved POA   No resolved problems to display.

## 2017-08-21 NOTE — ANESTHESIA POSTPROCEDURE EVALUATION
"Anesthesia Post Evaluation    Patient: Susan Anaya    Procedure(s) Performed: Procedure(s) (LRB):  INCISION-VULVA (N/A)    Final Anesthesia Type: general  Patient location during evaluation: PACU  Patient participation: Yes- Able to Participate  Level of consciousness: awake and alert  Post-procedure vital signs: reviewed and stable  Pain management: adequate  Airway patency: patent  PONV status at discharge: No PONV  Anesthetic complications: no      Cardiovascular status: blood pressure returned to baseline  Respiratory status: unassisted and spontaneous ventilation  Hydration status: euvolemic  Follow-up not needed.        Visit Vitals  /71 (BP Location: Right arm, Patient Position: Lying)   Pulse 95   Temp 36.7 °C (98 °F) (Oral)   Resp 18   Ht 5' 5" (1.651 m)   Wt 80.3 kg (177 lb)   LMP 07/27/2017   SpO2 100%   BMI 29.45 kg/m²       Pain/Rashida Score: Presence of Pain: denies (8/21/2017  6:24 AM)      "

## 2017-08-21 NOTE — PLAN OF CARE
Patient prefers to have [facility] present for discharge teaching. Please contact them @[phone].

## 2017-08-21 NOTE — DISCHARGE INSTRUCTIONS
Discharge Instructions: After Your Surgery  Youve just had surgery. During surgery you were given medicine called anesthesia to keep you relaxed and free of pain. After surgery you may have some pain or nausea. This is common. Here are some tips for feeling better and getting well after surgery.     Stay on schedule with your medication.   Going home  Your doctor or nurse will show you how to take care of yourself when you go home. He or she will also answer your questions. Have an adult family member or friend drive you home. For the first 24 hours after your surgery:  · Do not drive or use heavy equipment.  · Do not make important decisions or sign legal papers.  · Do not drink alcohol.  · Have someone stay with you, if needed. He or she can watch for problems and help keep you safe.  Be sure to go to all follow-up visits with your doctor. And rest after your surgery for as long as your doctor tells you to.  Coping with pain  If you have pain after surgery, pain medicine will help you feel better. Take it as told, before pain becomes severe. Also, ask your doctor or pharmacist about other ways to control pain. This might be with heat, ice, or relaxation. And follow any other instructions your surgeon or nurse gives you.  Tips for taking pain medicine  To get the best relief possible, remember these points:  · Pain medicines can upset your stomach. Taking them with a little food may help.  · Most pain relievers taken by mouth need at least 20 to 30 minutes to start to work.  · Taking medicine on a schedule can help you remember to take it. Try to time your medicine so that you can take it before starting an activity. This might be before you get dressed, go for a walk, or sit down for dinner.  · Constipation is a common side effect of pain medicines. Call your doctor before taking any medicines such as laxatives or stool softeners to help ease constipation. Also ask if you should skip any foods.  Drinking lots of fluids and eating foods such as fruits and vegetables that are high in fiber can also help. Remember, do not take laxatives unless your surgeon has prescribed them.  · Drinking alcohol and taking pain medicine can cause dizziness and slow your breathing. It can even be deadly. Do not drink alcohol while taking pain medicine.  · Pain medicine can make you react more slowly to things. Do not drive or run machinery while taking pain medicine.  Your health care provider may tell you to take acetaminophen to help ease your pain. Ask him or her how much you are supposed to take each day. Acetaminophen or other pain relievers may interact with your prescription medicines or other over-the-counter (OTC) drugs. Some prescription medicines have acetaminophen and other ingredients. Using both prescription and OTC acetaminophen for pain can cause you to overdose. Read the labels on your OTC medicines with care. This will help you to clearly know the list of ingredients, how much to take, and any warnings. It may also help you not take too much acetaminophen. If you have questions or do not understand the information, ask your pharmacist or health care provider to explain it to you before you take the OTC medicine.  Managing nausea  Some people have an upset stomach after surgery. This is often because of anesthesia, pain, or pain medicine, or the stress of surgery. These tips will help you handle nausea and eat healthy foods as you get better. If you were on a special food plan before surgery, ask your doctor if you should follow it while you get better. These tips may help:  · Do not push yourself to eat. Your body will tell you when to eat and how much.  · Start off with clear liquids and soup. They are easier to digest.  · Next try semi-solid foods, such as mashed potatoes, applesauce, and gelatin, as you feel ready.  · Slowly move to solid foods. Dont eat fatty, rich, or spicy foods at first.  · Do not  force yourself to have 3 large meals a day. Instead eat smaller amounts more often.  · Take pain medicines with a small amount of solid food, such as crackers or toast, to avoid nausea.     Call your surgeon if  · You still have pain an hour after taking medicine. The medicine may not be strong enough.  · You feel too sleepy, dizzy, or groggy. The medicine may be too strong.  · You have side effects like nausea, vomiting, or skin changes, such as rash, itching, or hives.       If you have obstructive sleep apnea  You were given anesthesia medicine during surgery to keep you comfortable and free of pain. After surgery, you may have more apnea spells because of this medicine and other medicines you were given. The spells may last longer than usual.   At home:  · Keep using the continuous positive airway pressure (CPAP) device when you sleep. Unless your health care provider tells you not to, use it when you sleep, day or night. CPAP is a common device used to treat obstructive sleep apnea.  · Talk with your provider before taking any pain medicine, muscle relaxants, or sedatives. Your provider will tell you about the possible dangers of taking these medicines.  Date Last Reviewed: 10/16/2014  © 4384-8415 [employer]. [address] [place] [address], [place] [address]. All rights reserved. This information is not intended as a substitute for professional medical care. Always follow your healthcare professional's instructions.      Your feedback is important to us.  If you should receive a survey in the next few days, please share your experience with us.

## 2017-08-22 ENCOUNTER — PATIENT MESSAGE (OUTPATIENT)
Dept: OBSTETRICS AND GYNECOLOGY | Facility: CLINIC | Age: 37
End: 2017-08-22

## 2017-08-25 ENCOUNTER — TELEPHONE (OUTPATIENT)
Dept: OBSTETRICS AND GYNECOLOGY | Facility: CLINIC | Age: 37
End: 2017-08-25

## 2017-08-25 ENCOUNTER — OFFICE VISIT (OUTPATIENT)
Dept: OBSTETRICS AND GYNECOLOGY | Facility: CLINIC | Age: 37
End: 2017-08-25
Payer: COMMERCIAL

## 2017-08-25 VITALS
DIASTOLIC BLOOD PRESSURE: 80 MMHG | BODY MASS INDEX: 30.19 KG/M2 | HEIGHT: 65 IN | SYSTOLIC BLOOD PRESSURE: 126 MMHG | WEIGHT: 181.19 LBS

## 2017-08-25 DIAGNOSIS — L76.82 PAIN AT SURGICAL INCISION: Primary | ICD-10-CM

## 2017-08-25 PROCEDURE — 99213 OFFICE O/P EST LOW 20 MIN: CPT | Mod: 24,S$GLB,, | Performed by: NURSE PRACTITIONER

## 2017-08-25 PROCEDURE — 99999 PR PBB SHADOW E&M-EST. PATIENT-LVL III: CPT | Mod: PBBFAC,,, | Performed by: NURSE PRACTITIONER

## 2017-08-25 PROCEDURE — 3008F BODY MASS INDEX DOCD: CPT | Mod: S$GLB,,, | Performed by: NURSE PRACTITIONER

## 2017-08-25 NOTE — TELEPHONE ENCOUNTER
----- Message from Reji Rebollar sent at 8/25/2017 12:32 PM CDT -----  Contact: Pt  X_ 1st Request  _ 2nd Request  _ 3rd Request    Who: YAN WATKINS [[id number]]    Why: Patient is returning a call    What Number to Call Back: [phone]    When to Expect a call back: (Before the end of the day)  -- if call after 3:00 call back will be tomorrow.

## 2017-08-25 NOTE — PROGRESS NOTES
CC: pain and oozing at incision site    HPI: Pt is a 37 y.o.  female who presents for  pain and oozing at incision site since yesterday after having a BM.  She S/P Incision of perineal abscess  4 days ago.  Reports she was having some bleeding after the BM which soaked 1 pad throughout the day.  Now the bleeding is scant.       ROS:  GENERAL: Feeling well overall. Denies fever or chills.   SKIN: Denies rash or lesions.   HEAD: Denies head injury or headache.   NODES: Denies enlarged lymph nodes.   CHEST: Denies chest pain or shortness of breath.   CARDIOVASCULAR: Denies palpitations or left sided chest pain.   ABDOMEN: No abdominal pain, constipation, diarrhea, nausea, vomiting or rectal bleeding.   URINARY: No dysuria, hematuria, or burning on urination.  REPRODUCTIVE: See HPI.   BREASTS: Denies pain, lumps, or nipple discharge.   HEMATOLOGIC: No easy bruisability or excessive bleeding.   MUSCULOSKELETAL: Denies joint pain or swelling.   NEUROLOGIC: Denies syncope or weakness.   PSYCHIATRIC: Denies depression, anxiety or mood swings.    PE:   APPEARANCE: Well nourished, well developed, White female in no acute distress.  Pelvis: Deferred  PERINEUM:  + sutures intact - small area of granulation tissue noted- silver nitrate applied to aid in reapproximation.  No surrounding erythema, edema, or purulent drainage.       Diagnosis:  1. Pain at surgical incision        Plan:   Discussed to keep area clean and dry   Discussed to cleanse with antibacterial soap such as Hibiclens  Discussed Colace daily to help with BM passing  Discussed to rest over the weekend/ avoid strenuous activities               Follow-up with Dr. Gomes next week as scheduled    JULIA Barton

## 2017-08-25 NOTE — TELEPHONE ENCOUNTER
Patient states doing well, after having BM, noticed having some drainage from area. Patient given appointment today with NP

## 2017-08-27 ENCOUNTER — PATIENT MESSAGE (OUTPATIENT)
Dept: OBSTETRICS AND GYNECOLOGY | Facility: CLINIC | Age: 37
End: 2017-08-27

## 2017-08-29 ENCOUNTER — OFFICE VISIT (OUTPATIENT)
Dept: OBSTETRICS AND GYNECOLOGY | Facility: CLINIC | Age: 37
End: 2017-08-29
Payer: COMMERCIAL

## 2017-08-29 VITALS
DIASTOLIC BLOOD PRESSURE: 80 MMHG | BODY MASS INDEX: 29.87 KG/M2 | SYSTOLIC BLOOD PRESSURE: 112 MMHG | WEIGHT: 179.25 LBS | HEIGHT: 65 IN

## 2017-08-29 DIAGNOSIS — Z98.890 STATUS POST INCISION AND DRAINAGE: Primary | ICD-10-CM

## 2017-08-29 PROCEDURE — 99999 PR PBB SHADOW E&M-EST. PATIENT-LVL III: CPT | Mod: PBBFAC,,, | Performed by: OBSTETRICS & GYNECOLOGY

## 2017-08-29 PROCEDURE — 99024 POSTOP FOLLOW-UP VISIT: CPT | Mod: S$GLB,,, | Performed by: OBSTETRICS & GYNECOLOGY

## 2017-08-29 NOTE — PROGRESS NOTES
"CC: Postop visit    Susan Anaya is a 37 y.o. female  post-op from an I&D and resection of abscess tract on 2017..  Patient is doing well postoperatively.  No pain.  Skin sutures pulled through last Thursday.  No purulent drainage.    The pathology revealed:  Chronic inflammation    Past Medical History:   Diagnosis Date    Abnormal Pap smear     Abnormal Pap smear of vagina         Allergy     Asthma     h/o    Celiac disease     gluten sensitivity    General anesthetics causing adverse effect in therapeutic use     PONV (postoperative nausea and vomiting)      Past Surgical History:   Procedure Laterality Date     SECTION      X 2     COSMETIC SURGERY      liposuction onpelvic area    PELVIC LAPAROSCOPY       Family History   Problem Relation Age of Onset    Hypertension Father     Breast cancer Paternal Aunt     Cancer Maternal Grandfather 80     lung ca    Diabetes Maternal Grandfather 60    Hypertension Paternal Grandmother     Hypertension Paternal Grandfather     Aneurysm Paternal Grandfather     Asthma Son     Hypotension Maternal Grandmother     Breast cancer Maternal Grandmother     Colon cancer Neg Hx     Ovarian cancer Neg Hx      Social History   Substance Use Topics    Smoking status: Never Smoker    Smokeless tobacco: Never Used    Alcohol use No     OB History    Para Term  AB Living   2 2 2     2   SAB TAB Ectopic Multiple Live Births           2      # Outcome Date GA Lbr Parth/2nd Weight Sex Delivery Anes PTL Lv   2 Term 10/08/08   3.147 kg (6 lb 15 oz) M CS-LTranv Spinal N LANA   1 Term 08   4.082 kg (9 lb) M CS-LTranv Spinal N LANA          /80   Ht 5' 5" (1.651 m)   Wt 81.3 kg (179 lb 3.7 oz)   LMP 2017   BMI 29.83 kg/m²     ROS:  GENERAL: No fever, chills, fatigability or weight loss.  VULVAR: No pain, no lesions and no itching.  VAGINAL: No relaxation, no itching, no discharge, no abnormal bleeding and " no lesions.  ABDOMEN: No abdominal pain. Denies nausea. Denies vomiting. No diarrhea. No constipation  BREAST: Denies pain. No lumps. No discharge.  URINARY: No incontinence, no nocturia, no frequency and no dysuria.  CARDIOVASCULAR: No chest pain. No shortness of breath. No leg cramps.  NEUROLOGICAL: No headaches. No vision changes.    PE:   ABDOMEN:  Soft, non-tender, non-distended.  PELVIC:  Bimanual exam deferred  Right perineum lesion - sutures pulled through. Minimal drainage.  Minimal ecchymosis in gluteal area.      ICD-10-CM ICD-9-CM    1. Status post incision and drainage Z98.890 V45.89      Continue local care.  Will allow second intention healing.  Follow-up:  1-2 weeks.    Mac Gomes IV, MD

## 2017-09-05 ENCOUNTER — OFFICE VISIT (OUTPATIENT)
Dept: OBSTETRICS AND GYNECOLOGY | Facility: CLINIC | Age: 37
End: 2017-09-05
Payer: COMMERCIAL

## 2017-09-05 VITALS
DIASTOLIC BLOOD PRESSURE: 70 MMHG | WEIGHT: 180.13 LBS | SYSTOLIC BLOOD PRESSURE: 106 MMHG | BODY MASS INDEX: 30.75 KG/M2 | HEIGHT: 64 IN

## 2017-09-05 DIAGNOSIS — Z98.890 STATUS POST INCISION AND DRAINAGE: Primary | ICD-10-CM

## 2017-09-05 PROCEDURE — 99999 PR PBB SHADOW E&M-EST. PATIENT-LVL III: CPT | Mod: PBBFAC,,, | Performed by: OBSTETRICS & GYNECOLOGY

## 2017-09-05 PROCEDURE — 99024 POSTOP FOLLOW-UP VISIT: CPT | Mod: S$GLB,,, | Performed by: OBSTETRICS & GYNECOLOGY

## 2017-09-05 NOTE — PROGRESS NOTES
"CC: Postop wound check    Susan Anaya is a 37 y.o. female  post-op from an I&D and resection of abscess tract on 2017.  Patient is doing well postoperatively and is here for a wound check. No pain.  Incision site healing well by second intention.  No purulent drainage reported.        Past Medical History:   Diagnosis Date    Abnormal Pap smear     Abnormal Pap smear of cervix     Abnormal Pap smear of vagina         Allergy     Asthma     h/o    Celiac disease     gluten sensitivity    General anesthetics causing adverse effect in therapeutic use     PONV (postoperative nausea and vomiting)      Past Surgical History:   Procedure Laterality Date     SECTION      X 2     COSMETIC SURGERY      liposuction onpelvic area    PELVIC LAPAROSCOPY       Family History   Problem Relation Age of Onset    Hypertension Father     Breast cancer Paternal Aunt     Cancer Maternal Grandfather 80     lung ca    Diabetes Maternal Grandfather 60    Hypertension Paternal Grandmother     Hypertension Paternal Grandfather     Aneurysm Paternal Grandfather     Asthma Son     Hypotension Maternal Grandmother     Breast cancer Maternal Grandmother     Colon cancer Neg Hx     Ovarian cancer Neg Hx      Social History   Substance Use Topics    Smoking status: Never Smoker    Smokeless tobacco: Never Used    Alcohol use No     OB History    Para Term  AB Living   2 2 2     2   SAB TAB Ectopic Multiple Live Births           2      # Outcome Date GA Lbr Parth/2nd Weight Sex Delivery Anes PTL Lv   2 Term 10/08/08   3.147 kg (6 lb 15 oz) M CS-LTranv Spinal N LANA   1 Term 08   4.082 kg (9 lb) M CS-LTranv Spinal N LANA          /70   Ht 5' 4" (1.626 m)   Wt 81.7 kg (180 lb 1.9 oz)   LMP 2017   BMI 30.92 kg/m²     ROS:  GENERAL: No fever, chills, fatigability or weight loss.  VULVAR: No pain, no lesions and no itching.  VAGINAL: No relaxation, no itching, " no discharge, no abnormal bleeding and no lesions.  ABDOMEN: No abdominal pain. Denies nausea. Denies vomiting. No diarrhea. No constipation  BREAST: Denies pain. No lumps. No discharge.  URINARY: No incontinence, no nocturia, no frequency and no dysuria.  CARDIOVASCULAR: No chest pain. No shortness of breath. No leg cramps.  NEUROLOGICAL: No headaches. No vision changes.    PE:   ABDOMEN:  Soft, non-tender, non-distended.  PELVIC:  Bimanual exam deferred  Right perineum lesion - sutures removed.  Minimal drainage.  Ecchymosis in gluteal area gone.        ICD-10-CM ICD-9-CM    1. Status post incision and drainage Z98.890 V45.89      Continue local care/sitz baths/avoiding baths.    Follow-up:  2 weeks.

## 2017-09-19 ENCOUNTER — OFFICE VISIT (OUTPATIENT)
Dept: OBSTETRICS AND GYNECOLOGY | Facility: CLINIC | Age: 37
End: 2017-09-19
Payer: COMMERCIAL

## 2017-09-19 VITALS
DIASTOLIC BLOOD PRESSURE: 76 MMHG | SYSTOLIC BLOOD PRESSURE: 110 MMHG | HEIGHT: 65 IN | BODY MASS INDEX: 29.87 KG/M2 | WEIGHT: 179.25 LBS

## 2017-09-19 DIAGNOSIS — Z09 SURGERY FOLLOW-UP EXAMINATION: Primary | ICD-10-CM

## 2017-09-19 DIAGNOSIS — Z98.890 STATUS POST INCISION AND DRAINAGE: ICD-10-CM

## 2017-09-19 PROCEDURE — 99024 POSTOP FOLLOW-UP VISIT: CPT | Mod: S$GLB,,, | Performed by: OBSTETRICS & GYNECOLOGY

## 2017-09-19 PROCEDURE — 99999 PR PBB SHADOW E&M-EST. PATIENT-LVL II: CPT | Mod: PBBFAC,,, | Performed by: OBSTETRICS & GYNECOLOGY

## 2017-09-21 ENCOUNTER — PATIENT MESSAGE (OUTPATIENT)
Dept: OBSTETRICS AND GYNECOLOGY | Facility: CLINIC | Age: 37
End: 2017-09-21

## 2017-09-26 ENCOUNTER — PATIENT MESSAGE (OUTPATIENT)
Dept: OBSTETRICS AND GYNECOLOGY | Facility: CLINIC | Age: 37
End: 2017-09-26

## 2017-09-27 ENCOUNTER — OFFICE VISIT (OUTPATIENT)
Dept: OBSTETRICS AND GYNECOLOGY | Facility: CLINIC | Age: 37
End: 2017-09-27
Payer: COMMERCIAL

## 2017-09-27 VITALS
HEIGHT: 65 IN | DIASTOLIC BLOOD PRESSURE: 70 MMHG | BODY MASS INDEX: 29.9 KG/M2 | WEIGHT: 179.44 LBS | SYSTOLIC BLOOD PRESSURE: 120 MMHG

## 2017-09-27 DIAGNOSIS — Z98.890 STATUS POST INCISION AND DRAINAGE: ICD-10-CM

## 2017-09-27 DIAGNOSIS — N90.9 VULVAR DISORDER: Primary | ICD-10-CM

## 2017-09-27 PROCEDURE — 99999 PR PBB SHADOW E&M-EST. PATIENT-LVL II: CPT | Mod: PBBFAC,,, | Performed by: OBSTETRICS & GYNECOLOGY

## 2017-09-27 PROCEDURE — 99212 OFFICE O/P EST SF 10 MIN: CPT | Mod: S$GLB,,, | Performed by: OBSTETRICS & GYNECOLOGY

## 2017-09-27 PROCEDURE — 3008F BODY MASS INDEX DOCD: CPT | Mod: S$GLB,,, | Performed by: OBSTETRICS & GYNECOLOGY

## 2017-09-27 NOTE — PROGRESS NOTES
CC:  Purulent drainage from wound    HPI:  Susan Anaya is a 37 y.o. female patient  who presents today s/p drainage from I&D site.  Patient reported suture expulsion with purulent drainage yesterday.  Improved today with no pain/ minimal drainage.  No other Gyn complaints.    Patient's last menstrual period was 2017.    Past Medical History:   Diagnosis Date    Abnormal Pap smear     Abnormal Pap smear of cervix     Abnormal Pap smear of vagina         Allergy     Asthma     h/o    Celiac disease     gluten sensitivity    General anesthetics causing adverse effect in therapeutic use     PONV (postoperative nausea and vomiting)        Past Surgical History:   Procedure Laterality Date     SECTION      X 2     COSMETIC SURGERY      liposuction onpelvic area    PELVIC LAPAROSCOPY           ROS:  GENERAL: Feeling well overall.   SKIN: Denies rash or lesions.   HEAD: Denies head injury or headache.   NODES: Denies enlarged lymph nodes.   CHEST: Denies chest pain or shortness of breath.   CARDIOVASCULAR: Denies palpitations or left sided chest pain.   ABDOMEN: No abdominal pain, nausea, vomiting or rectal bleeding.   URINARY: No dysuria, hematuria, or burning on urination.  REPRODUCTIVE: See HPI.   BREASTS: Denies pain, lumps, or nipple discharge.   HEMATOLOGIC: No easy bruisability or excessive bleeding.   MUSCULOSKELETAL: Denies joint pain or swelling.   NEUROLOGIC: Denies syncope or weakness.   PSYCHIATRIC: Denies depression.    PE:   APPEARANCE: Well nourished, well developed, in no acute distress.  ABDOMEN: Soft. No tenderness or masses. No hernias. No CVA tenderness.  VULVA: Normal female genitalia.  ANUS PERINEUM: Right perineum area healing well.  No erythema or drainage noted.  Suture site noted with no suture material visualized. No induration or evidence of new abscess formation.  Site non-tender to palpation.    Diagnosis:  1. Vulvar disorder    2. Status post  incision and drainage      PLAN:    Patient was counseled today on findings.  No evidence of infection or abscess formation  No treatment recommended today  Local care reviewed    Patient to contact office for drainage or signs of infection.    Follow-up:  Annual exam    Mac Gomes IV, MD

## 2017-10-14 ENCOUNTER — PATIENT MESSAGE (OUTPATIENT)
Dept: OBSTETRICS AND GYNECOLOGY | Facility: CLINIC | Age: 37
End: 2017-10-14

## 2017-10-24 ENCOUNTER — OFFICE VISIT (OUTPATIENT)
Dept: OBSTETRICS AND GYNECOLOGY | Facility: CLINIC | Age: 37
End: 2017-10-24
Payer: COMMERCIAL

## 2017-10-24 VITALS
DIASTOLIC BLOOD PRESSURE: 90 MMHG | WEIGHT: 179.44 LBS | BODY MASS INDEX: 29.9 KG/M2 | HEIGHT: 65 IN | SYSTOLIC BLOOD PRESSURE: 124 MMHG

## 2017-10-24 DIAGNOSIS — N76.4 VULVAR ABSCESS: Primary | ICD-10-CM

## 2017-10-24 PROCEDURE — 99212 OFFICE O/P EST SF 10 MIN: CPT | Mod: S$GLB,,, | Performed by: OBSTETRICS & GYNECOLOGY

## 2017-10-24 PROCEDURE — 99999 PR PBB SHADOW E&M-EST. PATIENT-LVL II: CPT | Mod: PBBFAC,,, | Performed by: OBSTETRICS & GYNECOLOGY

## 2017-10-24 RX ORDER — KETOROLAC TROMETHAMINE 10 MG/1
TABLET, FILM COATED ORAL
Refills: 0 | COMMUNITY
Start: 2017-10-05 | End: 2017-10-30

## 2017-10-24 NOTE — PROGRESS NOTES
CC:    HPI:  Susan Anaya is a 37 y.o. female patient  who presents today for continued drainage of purulent material from right vulva/peritoneum.  Patient denies fever or chills.  Patient reports that area doesn't swell and become inflamed as it did before surgery, but purulent material comes from site with bowel movements and constant drainage reported.      Patient's last menstrual period was 10/05/2017.    Past Medical History:   Diagnosis Date    Abnormal Pap smear     Abnormal Pap smear of cervix     Abnormal Pap smear of vagina         Allergy     Asthma     h/o    Celiac disease     gluten sensitivity    General anesthetics causing adverse effect in therapeutic use     PONV (postoperative nausea and vomiting)        Past Surgical History:   Procedure Laterality Date     SECTION      X 2     COSMETIC SURGERY      liposuction onpelvic area    PELVIC LAPAROSCOPY           ROS:  GENERAL: Feeling well overall.   SKIN: Denies rash or lesions.   HEAD: Denies head injury or headache.   NODES: Denies enlarged lymph nodes.   CHEST: Denies chest pain or shortness of breath.   CARDIOVASCULAR: Denies palpitations or left sided chest pain.   ABDOMEN: No abdominal pain, nausea, vomiting or rectal bleeding.   URINARY: No dysuria, hematuria, or burning on urination.  REPRODUCTIVE: See HPI.   BREASTS: Denies pain, lumps, or nipple discharge.   HEMATOLOGIC: No easy bruisability or excessive bleeding.   MUSCULOSKELETAL: Denies joint pain or swelling.   NEUROLOGIC: Denies syncope or weakness.   PSYCHIATRIC: Denies depression.    PE:   APPEARANCE: Well nourished, well developed, in no acute distress.  ABDOMEN: Soft. No tenderness or masses. No hernias. No CVA tenderness.  VULVA: Normal female genitalia.  ANUS PERINEUM: Right perineum area with 1 cm area of granulation.  + purulent material noted on perineum (scant).  A small amount of drainage is noted when pressure is placed on healed  area.  No erythema or swelling noted.  Area is non-tender to touch.  No suture material visualized.Silver nitrate applied to granulation area.    Diagnosis:  1. Vulvar abscess          PLAN:       Patient was counseled today on findings.  Recommend colorectal evaluation as tract at surgery pushed up to rectal sidewall (possible fistula?),    Follow-up:  JONATHAN Gomes IV, MD

## 2017-10-30 ENCOUNTER — OFFICE VISIT (OUTPATIENT)
Dept: SURGERY | Facility: CLINIC | Age: 37
End: 2017-10-30
Payer: COMMERCIAL

## 2017-10-30 ENCOUNTER — TELEPHONE (OUTPATIENT)
Dept: SURGERY | Facility: CLINIC | Age: 37
End: 2017-10-30

## 2017-10-30 ENCOUNTER — ANESTHESIA EVENT (OUTPATIENT)
Dept: SURGERY | Facility: HOSPITAL | Age: 37
End: 2017-10-30
Payer: COMMERCIAL

## 2017-10-30 VITALS
WEIGHT: 179 LBS | SYSTOLIC BLOOD PRESSURE: 129 MMHG | BODY MASS INDEX: 29.82 KG/M2 | HEART RATE: 72 BPM | HEIGHT: 65 IN | DIASTOLIC BLOOD PRESSURE: 67 MMHG

## 2017-10-30 DIAGNOSIS — K61.0 PERIANAL ABSCESS: Primary | ICD-10-CM

## 2017-10-30 PROCEDURE — 46600 DIAGNOSTIC ANOSCOPY SPX: CPT | Mod: S$GLB,,, | Performed by: COLON & RECTAL SURGERY

## 2017-10-30 PROCEDURE — 99204 OFFICE O/P NEW MOD 45 MIN: CPT | Mod: 25,S$GLB,, | Performed by: COLON & RECTAL SURGERY

## 2017-10-30 PROCEDURE — 99999 PR PBB SHADOW E&M-EST. PATIENT-LVL III: CPT | Mod: PBBFAC,,, | Performed by: COLON & RECTAL SURGERY

## 2017-10-30 NOTE — ANESTHESIA PREPROCEDURE EVALUATION
Anesthesia Assessment: Preoperative EQUATION    Planned Procedure: Procedure(s) (LRB):  EXAM UNDER ANESTHESIA (N/A)  PLACEMENT-SETON DRAIN (N/A)  Requested Anesthesia Type:General/MAC  Surgeon: Kirk Lima MD  Service: Colon and Rectal  Known or anticipated Date of Surgery:11/7/2017    Surgeon notes: reviewed    Triage considerations:     The patient has no apparent active cardiac condition (No unstable coronary Syndrome such as severe unstable angina or recent [<1 month] myocardial infarction, decompensated CHF, severe valvular   disease or significant arrhythmia)    Previous anesthesia records:[facility] General 8/21/17 incision vulva; PONV with most surgeries    Other important co-morbidities: none     Tests already available:  No recent tests.      Plan:   Phone only     Navigation:  Straight Line to surgery.               No tests, anesthesia preop clinic visit, or consult required.       10/30/17 /Caitlyn Dunbar RN                                                                                                                  10/30/2017  Susan Anaya is a 37 y.o., female with a pre-operative diagnosis of Perianal abscess [K61.0] who is scheduled for Procedure(s) (LRB):  EXAM UNDER ANESTHESIA (N/A)  PLACEMENT-SETON DRAIN (N/A).     Requested anesthesia type: General/MAC  Surgeon: Kirk Lima MD  Allergies:   Review of patient's allergies indicates:   Allergen Reactions    Steroid [corticosteroids (glucocorticoids)] Other (See Comments)     Muscle weakness dizziness     Vital Sign Range:    Chronic Medications:   Prescriptions Prior to Admission   Medication Sig Dispense Refill Last Dose    MULTIVIT WITH CALCIUM,IRON,MIN (WOMEN'S MULTIPLE VITAMINS ORAL) Take by mouth.   Taking     Current Medications:   No current facility-administered medications for this encounter.      Medical History:   Past Medical History:   Diagnosis Date    Abnormal Pap smear     Abnormal Pap smear of cervix      Abnormal Pap smear of vagina     2000    Allergy     Asthma     h/o    Celiac disease     gluten sensitivity    General anesthetics causing adverse effect in therapeutic use     PONV (postoperative nausea and vomiting)      .    Anesthesia Evaluation    I have reviewed the Patient Summary Reports.    I have reviewed the Nursing Notes.   I have reviewed the Medications.     Review of Systems  Anesthesia Hx:  No problems with previous Anesthesia PONV in last, none recently, POC developed and informed patient. History of prior surgery of interest to airway management or planning: Previous anesthesia: General incision vulva 8/21/17- [facility] with general anesthesia.  Procedure performed at an [facility]. Airway issues documented on chart review include laryngeal mask airway used  Denies Family Hx of Anesthesia complications.  Personal Hx of Anesthesia complications, Post-Operative Nausea/Vomiting, in the past, but not with recent anesthetics / prophylaxis   Social:  Non-Smoker, No Alcohol Use    Hematology/Oncology:  Hematology Normal   Oncology Normal     EENT/Dental:EENT/Dental Normal   Cardiovascular:  Cardiovascular Normal   Functional Capacity good / => 4 METS, 5x weekly cardio until surgery in 8/2017; ADL's, walking; denies CP, SOB    Pulmonary:   Asthma (as child) Denies Sleep Apnea.    Renal/:  Renal/ Normal     Hepatic/GI:   Denies GERD.    Musculoskeletal:  Musculoskeletal Normal    Neurological:  Neurology Normal  Denies Pain    Endocrine:   Denies Diabetes.    Psych:  Psychiatric Normal         Phone contact 10/30/17 /Caitlyn Dunbar RN        Physical Exam  General:  Well nourished    Airway/Jaw/Neck:  Airway Findings: Mouth Opening: Normal Tongue: Normal  General Airway Assessment: Adult  Mallampati: I  TM Distance: Normal, at least 6 cm  Jaw/Neck Findings:     Neck ROM: Normal ROM      Dental:  Dental Findings: In tact   Chest/Lungs:  Chest/Lungs Findings: Clear to auscultation,  Normal Respiratory Rate     Heart/Vascular:  Heart Findings: Rate: Normal  Rhythm: Regular Rhythm  Sounds: Normal     Abdomen:  Abdomen Findings:  Normal, Soft, Nontender            Anesthesia Plan  Type of Anesthesia, risks & benefits discussed:  Anesthesia Type:  general, MAC  Patient's Preference: as indicated  Intra-op Monitoring Plan: standard ASA monitors  Intra-op Monitoring Plan Comments:   Post Op Pain Control Plan: multimodal analgesia  Post Op Pain Control Plan Comments:   Induction:   IV  Beta Blocker:  Patient is not currently on a Beta-Blocker (No further documentation required).       Informed Consent: Patient understands risks and agrees with Anesthesia plan.  Questions answered. Anesthesia consent signed with patient.  ASA Score: 3     Day of Surgery Review of History & Physical:  There are no significant changes.  H&P update referred to the provider.     Anesthesia Plan Notes: Reassurance given.        Ready For Surgery From Anesthesia Perspective.

## 2017-10-30 NOTE — TELEPHONE ENCOUNTER
----- Message from Eunice Chavez MA sent at 10/30/2017  1:23 PM CDT -----  Contact: [phone]  Needing to go over her pre-op instructions

## 2017-10-30 NOTE — PRE ADMISSION SCREENING
Anesthesia Assessment: Preoperative EQUATION    Planned Procedure: Procedure(s) (LRB):  EXAM UNDER ANESTHESIA (N/A)  PLACEMENT-SETON DRAIN (N/A)  Requested Anesthesia Type:General/MAC  Surgeon: Kirk Lima MD  Service: Colon and Rectal  Known or anticipated Date of Surgery:11/7/2017    Surgeon notes: reviewed    Triage considerations:     The patient has no apparent active cardiac condition (No unstable coronary Syndrome such as severe unstable angina or recent [<1 month] myocardial infarction, decompensated CHF, severe valvular   disease or significant arrhythmia)    Previous anesthesia records:[facility] General 8/21/17 incision vulva; PONV with most surgeries    Other important co-morbidities: none     Tests already available:  No recent tests.      Plan:   Phone only     Navigation:  Straight Line to surgery.               No tests, anesthesia preop clinic visit, or consult required.       10/30/17 /Caitlyn Dunbar RN

## 2017-10-30 NOTE — PROGRESS NOTES
History & Physical  Surgery      SUBJECTIVE:     Chief Complaint: Perianal Fistula    History of Present Illness: Susan Anaya is a 37 y.o. female who originally presented to her OB-GYN in May with a perianal abscess, she reports that the area never healed and experienced persistent drainage and consitutional symptoms leading to re-operation and debridement in August. She presents today with similar complaints, persistent drainage for the past 5 months, not particularly associated with pain, but accompanied by nausea and general malaise since her first operation in May. She states she had temporary relief of these symptoms after her second operation in August, but these recurred shortly after. She denies F/C, Constipation, Diarrhea, vomiting.       Current Outpatient Prescriptions on File Prior to Visit   Medication Sig    MULTIVIT WITH CALCIUM,IRON,MIN (WOMEN'S MULTIPLE VITAMINS ORAL) Take by mouth.    [DISCONTINUED] acetaminophen (TYLENOL) 325 MG tablet Take 325 mg by mouth every 6 (six) hours as needed for Pain.    [DISCONTINUED] ketorolac (TORADOL) 10 mg tablet take 1 tablet by mouth four times a day with meals and at bedtime with A SNACK     No current facility-administered medications on file prior to visit.        Review of patient's allergies indicates:   Allergen Reactions    Steroid [corticosteroids (glucocorticoids)] Other (See Comments)     Muscle weakness dizziness       Past Medical History:   Diagnosis Date    Abnormal Pap smear     Abnormal Pap smear of cervix     Abnormal Pap smear of vagina         Allergy     Asthma     h/o    Celiac disease     gluten sensitivity    General anesthetics causing adverse effect in therapeutic use     PONV (postoperative nausea and vomiting)      Past Surgical History:   Procedure Laterality Date     SECTION      X 2     COSMETIC SURGERY      liposuction onpelvic area    PELVIC LAPAROSCOPY       Family History   Problem Relation  Age of Onset    Hypertension Father     Breast cancer Paternal Aunt     Cancer Maternal Grandfather 80     lung ca    Diabetes Maternal Grandfather 60    Hypertension Paternal Grandmother     Hypertension Paternal Grandfather     Aneurysm Paternal Grandfather     Asthma Son     Hypotension Maternal Grandmother     Breast cancer Maternal Grandmother     Colon cancer Neg Hx     Ovarian cancer Neg Hx      Social History   Substance Use Topics    Smoking status: Never Smoker    Smokeless tobacco: Never Used    Alcohol use No        Review of Systems   Constitutional: Positive for fatigue. Negative for chills and fever.   Respiratory: Negative for cough, shortness of breath and wheezing.    Cardiovascular: Negative for chest pain and palpitations.   Gastrointestinal: Negative for abdominal distention, abdominal pain, anal bleeding, constipation, diarrhea, nausea and vomiting.     OBJECTIVE:     Vital Signs (Most Recent)  Pulse: 72 (10/30/17 0826)  BP: 129/67 (10/30/17 0826)    Physical Exam   Constitutional: She is oriented to person, place, and time. She appears well-developed and well-nourished.   Cardiovascular: Normal rate and regular rhythm.    Pulmonary/Chest: Effort normal. No respiratory distress.   Abdominal: Soft. She exhibits no distension. There is no tenderness.   Neurological: She is alert and oriented to person, place, and time.   Psychiatric: She has a normal mood and affect.   Anorectal: RAL fistula opening, palp tract to anus - purulence from anus with palpation of tract.  JEANETTE: normal tone. Anoscopy: Normal distal rectal mucosa - internal opening anterior midline.    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 I advised her that some of her constitutional symptoms such as malaise and fatigue might not entirely resolve with this procedure, which she understood.    - EUA with seton placement  next Tuesday 11/7/17 with Dr. Lima, informed consent obtained in clinic today. All of her questions were answered   - Will need to f/u in clinic for management afterwards.     Ney Cheng MD   [phone]  General Surgery [facility] have explained the risks, benefits, and alternatives of the procedure in detail.  The patient voices understanding and all questions have been answered. The patient agrees to proceed as planned.  Seton - the LIFT vs OVESCO in 1 month.

## 2017-11-06 ENCOUNTER — TELEPHONE (OUTPATIENT)
Dept: SURGERY | Facility: CLINIC | Age: 37
End: 2017-11-06

## 2017-11-07 ENCOUNTER — ANESTHESIA (OUTPATIENT)
Dept: SURGERY | Facility: HOSPITAL | Age: 37
End: 2017-11-07
Payer: COMMERCIAL

## 2017-11-07 ENCOUNTER — HOSPITAL ENCOUNTER (OUTPATIENT)
Facility: HOSPITAL | Age: 37
Discharge: HOME | End: 2017-11-07
Attending: COLON & RECTAL SURGERY | Admitting: COLON & RECTAL SURGERY
Payer: COMMERCIAL

## 2017-11-07 ENCOUNTER — SURGERY (OUTPATIENT)
Age: 37
End: 2017-11-07

## 2017-11-07 VITALS
WEIGHT: 177 LBS | BODY MASS INDEX: 29.49 KG/M2 | DIASTOLIC BLOOD PRESSURE: 66 MMHG | SYSTOLIC BLOOD PRESSURE: 107 MMHG | TEMPERATURE: 98 F | OXYGEN SATURATION: 100 % | HEIGHT: 65 IN | HEART RATE: 65 BPM | RESPIRATION RATE: 16 BRPM

## 2017-11-07 DIAGNOSIS — K60.3 FISTULA-IN-ANO: Primary | ICD-10-CM

## 2017-11-07 PROCEDURE — 63600175 PHARM REV CODE 636 W HCPCS: Performed by: NURSE ANESTHETIST, CERTIFIED REGISTERED

## 2017-11-07 PROCEDURE — 37000008 HC ANESTHESIA 1ST 15 MINUTES: Performed by: COLON & RECTAL SURGERY

## 2017-11-07 PROCEDURE — 36000704 HC OR TIME LEV I 1ST 15 MIN: Performed by: COLON & RECTAL SURGERY

## 2017-11-07 PROCEDURE — 46020 PLACEMENT OF SETON: CPT | Mod: ,,, | Performed by: COLON & RECTAL SURGERY

## 2017-11-07 PROCEDURE — D9220A PRA ANESTHESIA: Mod: CRNA,,, | Performed by: NURSE ANESTHETIST, CERTIFIED REGISTERED

## 2017-11-07 PROCEDURE — 27000221 HC OXYGEN, UP TO 24 HOURS

## 2017-11-07 PROCEDURE — 25000003 PHARM REV CODE 250: Performed by: COLON & RECTAL SURGERY

## 2017-11-07 PROCEDURE — 71000015 HC POSTOP RECOV 1ST HR: Performed by: COLON & RECTAL SURGERY

## 2017-11-07 PROCEDURE — D9220A PRA ANESTHESIA: Mod: ANES,,, | Performed by: ANESTHESIOLOGY

## 2017-11-07 PROCEDURE — 36000705 HC OR TIME LEV I EA ADD 15 MIN: Performed by: COLON & RECTAL SURGERY

## 2017-11-07 PROCEDURE — 37000009 HC ANESTHESIA EA ADD 15 MINS: Performed by: COLON & RECTAL SURGERY

## 2017-11-07 PROCEDURE — 25000003 PHARM REV CODE 250: Performed by: STUDENT IN AN ORGANIZED HEALTH CARE EDUCATION/TRAINING PROGRAM

## 2017-11-07 PROCEDURE — 71000039 HC RECOVERY, EACH ADD'L HOUR: Performed by: COLON & RECTAL SURGERY

## 2017-11-07 PROCEDURE — 71000033 HC RECOVERY, INTIAL HOUR: Performed by: COLON & RECTAL SURGERY

## 2017-11-07 PROCEDURE — 25000003 PHARM REV CODE 250: Performed by: NURSE ANESTHETIST, CERTIFIED REGISTERED

## 2017-11-07 PROCEDURE — 63600175 PHARM REV CODE 636 W HCPCS: Performed by: COLON & RECTAL SURGERY

## 2017-11-07 PROCEDURE — S0028 INJECTION, FAMOTIDINE, 20 MG: HCPCS | Performed by: NURSE ANESTHETIST, CERTIFIED REGISTERED

## 2017-11-07 PROCEDURE — C9290 INJ, BUPIVACAINE LIPOSOME: HCPCS | Performed by: COLON & RECTAL SURGERY

## 2017-11-07 RX ORDER — LORAZEPAM 0.5 MG/1
0.5 TABLET ORAL EVERY 6 HOURS PRN
COMMUNITY
End: 2018-01-05

## 2017-11-07 RX ORDER — DIPHENHYDRAMINE HYDROCHLORIDE 50 MG/ML
INJECTION INTRAMUSCULAR; INTRAVENOUS
Status: DISCONTINUED | OUTPATIENT
Start: 2017-11-07 | End: 2017-11-07

## 2017-11-07 RX ORDER — METOCLOPRAMIDE HYDROCHLORIDE 5 MG/ML
INJECTION INTRAMUSCULAR; INTRAVENOUS
Status: DISCONTINUED | OUTPATIENT
Start: 2017-11-07 | End: 2017-11-07

## 2017-11-07 RX ORDER — SODIUM CHLORIDE 9 MG/ML
INJECTION, SOLUTION INTRAVENOUS CONTINUOUS
Status: DISCONTINUED | OUTPATIENT
Start: 2017-11-07 | End: 2017-11-07 | Stop reason: HOSPADM

## 2017-11-07 RX ORDER — FENTANYL CITRATE 50 UG/ML
INJECTION, SOLUTION INTRAMUSCULAR; INTRAVENOUS
Status: DISCONTINUED | OUTPATIENT
Start: 2017-11-07 | End: 2017-11-07

## 2017-11-07 RX ORDER — ACETAMINOPHEN 10 MG/ML
INJECTION, SOLUTION INTRAVENOUS
Status: DISCONTINUED | OUTPATIENT
Start: 2017-11-07 | End: 2017-11-07

## 2017-11-07 RX ORDER — KETOROLAC TROMETHAMINE 10 MG/1
10 TABLET, FILM COATED ORAL EVERY 6 HOURS
Qty: 30 TABLET | Refills: 0 | Status: SHIPPED | OUTPATIENT
Start: 2017-11-07 | End: 2017-11-27

## 2017-11-07 RX ORDER — BUPIVACAINE HYDROCHLORIDE 2.5 MG/ML
INJECTION, SOLUTION EPIDURAL; INFILTRATION; INTRACAUDAL
Status: DISCONTINUED | OUTPATIENT
Start: 2017-11-07 | End: 2017-11-07 | Stop reason: HOSPADM

## 2017-11-07 RX ORDER — ACETAMINOPHEN 500 MG
1000 TABLET ORAL EVERY 6 HOURS PRN
Status: ON HOLD | COMMUNITY
End: 2017-12-13

## 2017-11-07 RX ORDER — MIDAZOLAM HYDROCHLORIDE 1 MG/ML
INJECTION, SOLUTION INTRAMUSCULAR; INTRAVENOUS
Status: DISCONTINUED | OUTPATIENT
Start: 2017-11-07 | End: 2017-11-07

## 2017-11-07 RX ORDER — ONDANSETRON 2 MG/ML
4 INJECTION INTRAMUSCULAR; INTRAVENOUS DAILY PRN
Status: DISCONTINUED | OUTPATIENT
Start: 2017-11-07 | End: 2017-11-07 | Stop reason: HOSPADM

## 2017-11-07 RX ORDER — PROPOFOL 10 MG/ML
VIAL (ML) INTRAVENOUS
Status: DISCONTINUED | OUTPATIENT
Start: 2017-11-07 | End: 2017-11-07

## 2017-11-07 RX ORDER — SODIUM CHLORIDE 0.9 % (FLUSH) 0.9 %
3 SYRINGE (ML) INJECTION
Status: DISCONTINUED | OUTPATIENT
Start: 2017-11-07 | End: 2017-11-07 | Stop reason: HOSPADM

## 2017-11-07 RX ORDER — SUCCINYLCHOLINE CHLORIDE 20 MG/ML
INJECTION INTRAMUSCULAR; INTRAVENOUS
Status: DISCONTINUED | OUTPATIENT
Start: 2017-11-07 | End: 2017-11-07

## 2017-11-07 RX ORDER — HYDROMORPHONE HYDROCHLORIDE 1 MG/ML
0.2 INJECTION, SOLUTION INTRAMUSCULAR; INTRAVENOUS; SUBCUTANEOUS EVERY 5 MIN PRN
Status: DISCONTINUED | OUTPATIENT
Start: 2017-11-07 | End: 2017-11-07 | Stop reason: HOSPADM

## 2017-11-07 RX ORDER — LIDOCAINE HYDROCHLORIDE 20 MG/ML
INJECTION, SOLUTION EPIDURAL; INFILTRATION; INTRACAUDAL; PERINEURAL
Status: DISCONTINUED | OUTPATIENT
Start: 2017-11-07 | End: 2017-11-07

## 2017-11-07 RX ORDER — KETOROLAC TROMETHAMINE 15 MG/ML
INJECTION, SOLUTION INTRAMUSCULAR; INTRAVENOUS
Status: DISCONTINUED | OUTPATIENT
Start: 2017-11-07 | End: 2017-11-07

## 2017-11-07 RX ORDER — ONDANSETRON 2 MG/ML
INJECTION INTRAMUSCULAR; INTRAVENOUS
Status: DISCONTINUED | OUTPATIENT
Start: 2017-11-07 | End: 2017-11-07

## 2017-11-07 RX ORDER — FAMOTIDINE 10 MG/ML
INJECTION INTRAVENOUS
Status: DISCONTINUED | OUTPATIENT
Start: 2017-11-07 | End: 2017-11-07

## 2017-11-07 RX ADMIN — FENTANYL CITRATE 50 MCG: 50 INJECTION, SOLUTION INTRAMUSCULAR; INTRAVENOUS at 04:11

## 2017-11-07 RX ADMIN — FENTANYL CITRATE 50 MCG: 50 INJECTION, SOLUTION INTRAMUSCULAR; INTRAVENOUS at 03:11

## 2017-11-07 RX ADMIN — PROPOFOL 150 MG: 10 INJECTION, EMULSION INTRAVENOUS at 03:11

## 2017-11-07 RX ADMIN — LIDOCAINE HYDROCHLORIDE 80 MG: 20 INJECTION, SOLUTION EPIDURAL; INFILTRATION; INTRACAUDAL; PERINEURAL at 03:11

## 2017-11-07 RX ADMIN — METOCLOPRAMIDE 10 MG: 5 INJECTION, SOLUTION INTRAMUSCULAR; INTRAVENOUS at 03:11

## 2017-11-07 RX ADMIN — DIPHENHYDRAMINE HYDROCHLORIDE 12.5 MG: 50 INJECTION, SOLUTION INTRAMUSCULAR; INTRAVENOUS at 03:11

## 2017-11-07 RX ADMIN — SODIUM CHLORIDE: 0.9 INJECTION, SOLUTION INTRAVENOUS at 03:11

## 2017-11-07 RX ADMIN — GELATIN ABSORBABLE SPONGE 12-7 MM 2 EACH: 12-7 MISC at 04:11

## 2017-11-07 RX ADMIN — MIDAZOLAM HYDROCHLORIDE 2 MG: 1 INJECTION, SOLUTION INTRAMUSCULAR; INTRAVENOUS at 03:11

## 2017-11-07 RX ADMIN — SUCCINYLCHOLINE CHLORIDE 140 MG: 20 INJECTION, SOLUTION INTRAMUSCULAR; INTRAVENOUS at 03:11

## 2017-11-07 RX ADMIN — FAMOTIDINE 20 MG: 10 INJECTION, SOLUTION INTRAVENOUS at 03:11

## 2017-11-07 RX ADMIN — ONDANSETRON 8 MG: 2 INJECTION INTRAMUSCULAR; INTRAVENOUS at 03:11

## 2017-11-07 RX ADMIN — BUPIVACAINE 20 ML: 13.3 INJECTION, SUSPENSION, LIPOSOMAL INFILTRATION at 04:11

## 2017-11-07 RX ADMIN — BUPIVACAINE HYDROCHLORIDE 10 ML: 2.5 INJECTION, SOLUTION EPIDURAL; INFILTRATION; INTRACAUDAL; PERINEURAL at 04:11

## 2017-11-07 RX ADMIN — KETOROLAC TROMETHAMINE 30 MG: 15 INJECTION, SOLUTION INTRAMUSCULAR; INTRAVENOUS at 04:11

## 2017-11-07 RX ADMIN — PROPOFOL 50 MG: 10 INJECTION, EMULSION INTRAVENOUS at 03:11

## 2017-11-07 RX ADMIN — ACETAMINOPHEN 1000 MG: 10 INJECTION, SOLUTION INTRAVENOUS at 04:11

## 2017-11-07 NOTE — H&P (VIEW-ONLY)
History & Physical  Surgery      SUBJECTIVE:     Chief Complaint: Perianal Fistula    History of Present Illness: Susan Anaya is a 37 y.o. female who originally presented to her OB-GYN in May with a perianal abscess, she reports that the area never healed and experienced persistent drainage and consitutional symptoms leading to re-operation and debridement in August. She presents today with similar complaints, persistent drainage for the past 5 months, not particularly associated with pain, but accompanied by nausea and general malaise since her first operation in May. She states she had temporary relief of these symptoms after her second operation in August, but these recurred shortly after. She denies F/C, Constipation, Diarrhea, vomiting.       Current Outpatient Prescriptions on File Prior to Visit   Medication Sig    MULTIVIT WITH CALCIUM,IRON,MIN (WOMEN'S MULTIPLE VITAMINS ORAL) Take by mouth.    [DISCONTINUED] acetaminophen (TYLENOL) 325 MG tablet Take 325 mg by mouth every 6 (six) hours as needed for Pain.    [DISCONTINUED] ketorolac (TORADOL) 10 mg tablet take 1 tablet by mouth four times a day with meals and at bedtime with A SNACK     No current facility-administered medications on file prior to visit.        Review of patient's allergies indicates:   Allergen Reactions    Steroid [corticosteroids (glucocorticoids)] Other (See Comments)     Muscle weakness dizziness       Past Medical History:   Diagnosis Date    Abnormal Pap smear     Abnormal Pap smear of cervix     Abnormal Pap smear of vagina         Allergy     Asthma     h/o    Celiac disease     gluten sensitivity    General anesthetics causing adverse effect in therapeutic use     PONV (postoperative nausea and vomiting)      Past Surgical History:   Procedure Laterality Date     SECTION      X 2     COSMETIC SURGERY      liposuction onpelvic area    PELVIC LAPAROSCOPY       Family History   Problem Relation  Age of Onset    Hypertension Father     Breast cancer Paternal Aunt     Cancer Maternal Grandfather 80     lung ca    Diabetes Maternal Grandfather 60    Hypertension Paternal Grandmother     Hypertension Paternal Grandfather     Aneurysm Paternal Grandfather     Asthma Son     Hypotension Maternal Grandmother     Breast cancer Maternal Grandmother     Colon cancer Neg Hx     Ovarian cancer Neg Hx      Social History   Substance Use Topics    Smoking status: Never Smoker    Smokeless tobacco: Never Used    Alcohol use No        Review of Systems   Constitutional: Positive for fatigue. Negative for chills and fever.   Respiratory: Negative for cough, shortness of breath and wheezing.    Cardiovascular: Negative for chest pain and palpitations.   Gastrointestinal: Negative for abdominal distention, abdominal pain, anal bleeding, constipation, diarrhea, nausea and vomiting.     OBJECTIVE:     Vital Signs (Most Recent)  Pulse: 72 (10/30/17 0826)  BP: 129/67 (10/30/17 0826)    Physical Exam   Constitutional: She is oriented to person, place, and time. She appears well-developed and well-nourished.   Cardiovascular: Normal rate and regular rhythm.    Pulmonary/Chest: Effort normal. No respiratory distress.   Abdominal: Soft. She exhibits no distension. There is no tenderness.   Neurological: She is alert and oriented to person, place, and time.   Psychiatric: She has a normal mood and affect.   Anorectal: RAL fistula opening, palp tract to anus - purulence from anus with palpation of tract.  JEANETTE: normal tone. Anoscopy: Normal distal rectal mucosa - internal opening anterior midline.    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 I advised her that some of her constitutional symptoms such as malaise and fatigue might not entirely resolve with this procedure, which she understood.    - EUA with seton placement  next Tuesday 11/7/17 with Dr. Lima, informed consent obtained in clinic today. All of her questions were answered   - Will need to f/u in clinic for management afterwards.     Ney Cheng MD   [phone]  General Surgery [facility] have explained the risks, benefits, and alternatives of the procedure in detail.  The patient voices understanding and all questions have been answered. The patient agrees to proceed as planned.  Seton - the LIFT vs OVESCO in 1 month.

## 2017-11-07 NOTE — BRIEF OP NOTE
[facility]  Brief Operative Note     SUMMARY     Surgery Date: 11/7/2017     Surgeon(s) and Role:     * Benton Colon MD - Resident - Assisting     * Kirk Lima MD - Primary     * Ney Cheng MD      Pre-op Diagnosis:  Perianal abscess [K61.0]    Post-op Diagnosis:  Post-Op Diagnosis Codes:     * Perianal abscess [K61.0]    Procedure(s) (LRB):  EXAM UNDER ANESTHESIA (N/A)  PLACEMENT-SETON DRAIN (N/A)    Anesthesia: General/MAC    Description of the findings of the procedure: See Op Note     Findings/Key Components: Seton Placement in posterior fistula    Estimated Blood Loss: minimal         Specimens:   Specimen (12h ago through future)    None          Discharge Note    SUMMARY     Admit Date: 11/7/2017    Discharge Date and Time: 11/7/17    Hospital Course   Patient was admitted for seton placement in anal fistula and EUA. She tolerated the procedure well and without apparent complication. She was discharged home with follow up    Final Diagnosis: Post-Op Diagnosis Codes:     * Perianal abscess [K61.0]    Disposition: Home or Self Care    Follow Up/Patient Instructions:     Medications:  Reconciled Home Medications:   Current Discharge Medication List      START taking these medications    Details   ketorolac (TORADOL) 10 mg tablet Take 1 tablet (10 mg total) by mouth every 6 (six) hours.  Qty: 30 tablet, Refills: 0         CONTINUE these medications which have NOT CHANGED    Details   acetaminophen (TYLENOL) 500 MG tablet Take 1,000 mg by mouth every 6 (six) hours as needed for Pain.      LORazepam (ATIVAN) 0.5 MG tablet Take 0.5 mg by mouth every 6 (six) hours as needed for Anxiety.      MULTIVIT WITH CALCIUM,IRON,MIN (WOMEN'S MULTIPLE VITAMINS ORAL) Take by mouth.             Discharge Procedure Orders  Diet general     Activity as tolerated     Shower on day dressing removed (No bath)     Other restrictions (specify):   Order Comments: Do not drive on narcotic pain  medications until reaction times are back to a safe level.   Limit strenuous activity such as raking or pushing/pulling heavy objects     Call MD for:  temperature >100.4     Call MD for:  persistent nausea and vomiting or diarrhea     Call MD for:  severe uncontrolled pain     Call MD for:  difficulty breathing or increased cough     No dressing needed   Order Comments: There is guaze and foam in the anus. This will fall out during the first bowel movement or may be removed later.       Follow-up Information     Kirk Lima MD. Schedule an appointment as soon as possible for a visit in 2 weeks.    Specialty:  Colon and Rectal Surgery  Why:  For wound re-check  Contact information:  [address]  [facility] [address]  [phone]

## 2017-11-07 NOTE — OP NOTE
OPERATIVE NOTE     DATE OF PROCEDURE: 11/07/2017     SURGEON: Dr. Kirk Lima MD     ASSISTANT: Ney Cheng MD Res     PREOPERATIVE DIAGNOSIS:   1. Fistula in Ano     POSTOPERATIVE DIAGNOSIS:   1. Same as above      PROCEDURE: Exam Under Anesthesia, Seton Placement     ANESTHESIA: general     FINDINGS: Right anterior transsphincteric superificial fistula      ESTIMATED BLOOD LOSS: 10mL     IV FLUIDS: See Anesthesia Record     SPECIMENS: none   PROCEDURE IN DETAIL:      Patient was taken to the operating room where general anesthesia was administered and found to be adequate.  She was placed in the prone position, then prepped and draped in the usual sterile fashion. A surgical timeout was performed with patient's name, date of birth, procedure to be performed, and allergies verbalized. All OR staff in agreement. No preoperative antibiotics were administered as none were indicated.     A digital exam was preformed with normal sphincter tone. There was a  palpable cord in the right perianal tissue with an external opening of the fistula at the right anterior anal margin. A lacrimal probe was used in an attempt to find the internal opening, but it appeared the internal opening had closed over with a thin layer of mucosa. An 18 denny angiocath and 10cc syringe were used to inject hydrogen peroxide but this also did not reveal the an internal opening. The lacrimal probe was then reinserted and electrocautery was used to pass the probe into the anal canal. A seton was then placed and secured. 20cc of Exparel were injected into the perianal area and the skin and 2 pieces of gel foam were inserted into the anal canal. This was covered with gauze and held in place with surgical underwear     Sponge and instrument counts were correct x 2. The patient tolerated the procedure well and was awakened without difficulty. She was taken to the recovery room in stable condition. Dr. Lima was present and scrubbed for the  entirety of this procedure     Ney Cheng MD   [phone]  General Surgery [facility]

## 2017-11-07 NOTE — TRANSFER OF CARE
"Anesthesia Transfer of Care Note    Patient: Susan Anaya    Procedure(s) Performed: Procedure(s) (LRB):  EXAM UNDER ANESTHESIA (N/A)  PLACEMENT-SETON DRAIN (N/A)    Patient location: PACU    Anesthesia Type: general    Transport from OR: Transported from OR on 6-10 L/min O2 by face mask with adequate spontaneous ventilation    Post pain: adequate analgesia    Post assessment: no apparent anesthetic complications    Post vital signs: stable    Level of consciousness: awake    Nausea/Vomiting: no nausea/vomiting    Complications: none    Transfer of care protocol was followed      Last vitals:   Visit Vitals  /61 (BP Location: Right arm, Patient Position: Lying)   Pulse 76   Temp 36.5 °C (97.7 °F) (Oral)   Resp 19   Ht 5' 5" (1.651 m)   Wt 80.3 kg (177 lb)   LMP 10/23/2017   SpO2 100%   BMI 29.45 kg/m²     "

## 2017-11-08 NOTE — PLAN OF CARE
Discharge instructions given and explained to patient and family with verbalization of understanding all instructions. Prescription called in and explained next time and doses of each medication. Patients v/s stable, denies n/v and tolerating po, rates pain level tolerable, IV removed, and family at bedside for patient discharge home.

## 2017-11-09 ENCOUNTER — TELEPHONE (OUTPATIENT)
Dept: SURGERY | Facility: CLINIC | Age: 37
End: 2017-11-09

## 2017-11-09 NOTE — TELEPHONE ENCOUNTER
Spoke with patient.  Patient's questions about activity answered.  Patient wants to walk on treadmill,  her son and shower.  All okay to do.

## 2017-11-09 NOTE — TELEPHONE ENCOUNTER
----- Message from Ivon Early sent at 11/9/2017  8:24 AM CST -----  Contact: Pt:[phone]  Pt called and states she would like to speak with Arlene Haq in regards to her post op medication.

## 2017-11-09 NOTE — ANESTHESIA POSTPROCEDURE EVALUATION
"Anesthesia Post Evaluation    Patient: Susan Anaya    Procedure(s) Performed: Procedure(s) (LRB):  EXAM UNDER ANESTHESIA (N/A)  PLACEMENT-SETON DRAIN (N/A)    Final Anesthesia Type: general  Patient location during evaluation: PACU  Patient participation: Yes- Able to Participate  Level of consciousness: awake and alert  Post-procedure vital signs: reviewed and stable  Pain management: adequate  Airway patency: patent  PONV status at discharge: nausea (controlled)  Anesthetic complications: no      Cardiovascular status: blood pressure returned to baseline  Respiratory status: unassisted, spontaneous ventilation and room air  Hydration status: euvolemic          Visit Vitals  /66   Pulse 65   Temp 36.7 °C (98.1 °F) (Temporal)   Resp 16   Ht 5' 5" (1.651 m)   Wt 80.3 kg (177 lb)   LMP 10/23/2017   SpO2 100%   BMI 29.45 kg/m²       Pain/Rashida Score: Pain Assessment Performed: Yes (11/7/2017  6:30 PM)  Presence of Pain: denies (11/7/2017  6:30 PM)  Rashida Score: 10 (11/7/2017  6:30 PM)      "

## 2017-11-27 ENCOUNTER — OFFICE VISIT (OUTPATIENT)
Dept: SURGERY | Facility: CLINIC | Age: 37
End: 2017-11-27
Payer: COMMERCIAL

## 2017-11-27 VITALS
DIASTOLIC BLOOD PRESSURE: 75 MMHG | BODY MASS INDEX: 30.12 KG/M2 | WEIGHT: 181 LBS | HEART RATE: 84 BPM | SYSTOLIC BLOOD PRESSURE: 131 MMHG

## 2017-11-27 DIAGNOSIS — K60.3 FISTULA-IN-ANO: Primary | ICD-10-CM

## 2017-11-27 PROCEDURE — 99999 PR PBB SHADOW E&M-EST. PATIENT-LVL II: CPT | Mod: PBBFAC,,, | Performed by: COLON & RECTAL SURGERY

## 2017-11-27 PROCEDURE — 99024 POSTOP FOLLOW-UP VISIT: CPT | Mod: S$GLB,,, | Performed by: COLON & RECTAL SURGERY

## 2017-11-27 NOTE — PROGRESS NOTES
CRS Office Visit    SUBJECTIVE:     Chief Complaint: s/p EUA and seton placement for fistula-in-ano    History of Present Illness:  Patient is a 37 y.o. female who underwent above procedure on 2017. She has been doing well since. Has some intermittent mucoid drainage. Denies fevers, chills, redness/swelling, bleeding/purulence per rectum, or incontinence. She would like to discuss completion surgery.    Review of patient's allergies indicates:   Allergen Reactions    Steroid [corticosteroids (glucocorticoids)] Other (See Comments)     Muscle weakness dizziness       Past Medical History:   Diagnosis Date    Abnormal Pap smear     Abnormal Pap smear of cervix     Abnormal Pap smear of vagina         Allergy     Asthma     h/o    Celiac disease     gluten sensitivity    General anesthetics causing adverse effect in therapeutic use     PONV (postoperative nausea and vomiting)      Past Surgical History:   Procedure Laterality Date     SECTION      X 2     COSMETIC SURGERY      liposuction onpelvic area    PELVIC LAPAROSCOPY       Family History   Problem Relation Age of Onset    Hypertension Father     Breast cancer Paternal Aunt     Cancer Maternal Grandfather 80     lung ca    Diabetes Maternal Grandfather 60    Hypertension Paternal Grandmother     Hypertension Paternal Grandfather     Aneurysm Paternal Grandfather     Asthma Son     Hypotension Maternal Grandmother     Breast cancer Maternal Grandmother     Colon cancer Neg Hx     Ovarian cancer Neg Hx      Social History   Substance Use Topics    Smoking status: Never Smoker    Smokeless tobacco: Never Used    Alcohol use No        Review of Systems:  Constitutional: no fever or chills  Eyes: no visual changes  ENT: no nasal congestion or sore throat  Respiratory: no cough or shortness of breath  Cardiovascular: no chest pain or palpitations  Gastrointestinal: no nausea or vomiting, tolerating diet, mucoid discharge per  rectum  Genitourinary: no hematuria or dysuria  Integument/Breast: no rash or pruritis  Hematologic/Lymphatic: no easy bruising or lymphadenopathy  Musculoskeletal: no arthralgias or myalgias  Neurological: no seizures or tremors  Behavioral/Psych: no auditory or visual hallucinations  Endocrine: no heat or cold intolerance    OBJECTIVE:     Vital Signs (Most Recent)  Pulse: 84 (11/27/17 0957)  BP: 131/75 (11/27/17 0957)    Physical Exam:  General: White female in NAD sitting in chair in clinic  Neuro: aaox4 maex4 perrl  Respiratory: resps even unlabored  Cardiac: cap refill <2 sec  Extremities: Warm dry and intact  Anorectal Exam:    Anal Skin: Normal, draining seton in place, no erythema or swelling      ASSESSMENT/PLAN:     Susan was seen today for anal fistula.    Diagnoses and all orders for this visit:    Fistula-in-ano    Operative planning discussed in detail with the patient. In particular, we discussed the LIFT procedure, Ovesco clip placement, advancement flap, and plug/glue placement. Will plan to perform Ovesco clip placement in the coming weeks for completion therapy. Consents signed.

## 2017-12-01 ENCOUNTER — TELEPHONE (OUTPATIENT)
Dept: SURGERY | Facility: CLINIC | Age: 37
End: 2017-12-01

## 2017-12-01 NOTE — TELEPHONE ENCOUNTER
Spoke with patient.  She is checking to see if Dr. Lima had decided on a date for surgery.  I informed her that he was still working on it.

## 2017-12-01 NOTE — TELEPHONE ENCOUNTER
----- Message from Christa Payne sent at 12/1/2017 10:23 AM CST -----  Contact: Pt  Pt called to speak to the nurse regarding her sx date and would like a call back today.    Pt can be reached at [phone].    Thanks

## 2017-12-04 ENCOUNTER — ANESTHESIA EVENT (OUTPATIENT)
Dept: SURGERY | Facility: HOSPITAL | Age: 37
End: 2017-12-04
Payer: COMMERCIAL

## 2017-12-04 DIAGNOSIS — Z01.818 PREOP TESTING: Primary | ICD-10-CM

## 2017-12-04 DIAGNOSIS — K60.3 FISTULA-IN-ANO: Primary | ICD-10-CM

## 2017-12-04 RX ORDER — MUPIROCIN 20 MG/G
OINTMENT TOPICAL
Status: CANCELLED | OUTPATIENT
Start: 2017-12-04

## 2017-12-04 RX ORDER — SODIUM CHLORIDE 9 MG/ML
INJECTION, SOLUTION INTRAVENOUS CONTINUOUS
Status: CANCELLED | OUTPATIENT
Start: 2017-12-04

## 2017-12-04 RX ORDER — ACETAMINOPHEN 10 MG/ML
1000 INJECTION, SOLUTION INTRAVENOUS
Status: CANCELLED | OUTPATIENT
Start: 2017-12-04 | End: 2017-12-04

## 2017-12-04 NOTE — PRE ADMISSION SCREENING
Anesthesia Assessment: Preoperative EQUATION    Planned Procedure: Procedure(s) (LRB):  EXAM UNDER ANESTHESIA with ovesco clip placement (N/A)  Requested Anesthesia Type:General/MAC  Surgeon: Kirk Lima MD  Service: Colon and Rectal  Known or anticipated Date of Surgery:12/13/2017    Surgeon notes: reviewed and Fistula-in-ano    Previous anesthesia records:11/7/17-Exan Under Anesthesia(EUA) Placement-Seton Drain-General  PONV with most surgeries    Last PCP note: 3-6 months ago , within [facility] , focused visit      Subspecialty notes: OB/GYN     Tests already available:  No recent tests.                Plan:    Testing:  None needed at this time        Patient  has previously scheduled Medical Appointment:Appointment on 12/4/17, prior to surgery date.    Navigation: Straight line to surgery. No testing,studies or appointments needed prior to surgery.              Zarina Robins RN  12/4/17

## 2017-12-04 NOTE — ANESTHESIA PREPROCEDURE EVALUATION
Anesthesia Assessment: Preoperative EQUATION    Planned Procedure: Procedure(s) (LRB):  EXAM UNDER ANESTHESIA with ovesco clip placement (N/A)  Requested Anesthesia Type:General/MAC  Surgeon: Kirk Lima MD  Service: Colon and Rectal  Known or anticipated Date of Surgery:12/13/2017    Surgeon notes: reviewed and Fistula-in-ano    Previous anesthesia records:11/7/17-Exan Under Anesthesia(EUA) Placement-Seton Drain-General  PONV with most surgeries/Slow To Awaken/Delayed Emergence    Last PCP note: 3-6 months ago , within [facility] , focused visit      Subspecialty notes: OB/GYN     Tests already available:  No recent tests.                Plan:    Testing:  None needed at this time        Patient  has previously scheduled Medical Appointment:Appointment on 12/4/17, prior to surgery date.    Navigation: Straight Line to surgery. No tests,anesthesia pre op clinic visit, or consult required.              Zarina Robins RN  12/4/17 12/04/2017  Susan Anaya is a 37 y.o., female.    Anesthesia Evaluation    I have reviewed the Patient Summary Reports.    I have reviewed the Nursing Notes.   I have reviewed the Medications.     Review of Systems  Anesthesia Hx:  No problems with previous Anesthesia Hx of Anesthetic complications PONV in last, none recently, POC developed and informed patient. History of prior surgery of interest to airway management or planning: Previous anesthesia: General incision vulva 8/21/17- Cheondoism with general anesthesia.  Procedure performed at an [facility]. Airway issues documented on chart review include laryngeal mask airway used  Denies Family Hx of Anesthesia complications.  Personal Hx of Anesthesia complications, Post-Operative Nausea/Vomiting, in the past, but not with recent anesthetics / prophylaxis Slow To Awaken/Delayed Emergence and mild, somewhat  sensitive to sedation / narcotics   Social:  Non-Smoker, No Alcohol Use    Hematology/Oncology:  Hematology Normal   Oncology Normal     EENT/Dental:EENT/Dental Normal   Cardiovascular:  Cardiovascular Normal Exercise tolerance: good   Functional Capacity good / => 4 METS, 5x weekly cardio until surgery in 8/2017; ADL's, walking; denies CP, SOB    Pulmonary:   Asthma Denies Sleep Apnea. As a child   Renal/:  Renal/ Normal     Hepatic/GI:   Denies GERD. Fistula-in-ano   Musculoskeletal:  Musculoskeletal Normal    Neurological:  Neurology Normal  Denies Pain    Endocrine:   Denies Diabetes.    Psych:  Psychiatric Normal           Physical Exam  General:  Well nourished    Airway/Jaw/Neck:  Airway Findings: Mouth Opening: Normal Tongue: Normal  General Airway Assessment: Adult  Mallampati: I  TM Distance: Normal, at least 6 cm  Jaw/Neck Findings:     Neck ROM: Normal ROM      Dental:  Dental Findings: In tact   Chest/Lungs:  Chest/Lungs Findings: Clear to auscultation, Normal Respiratory Rate     Heart/Vascular:  Heart Findings: Rate: Normal  Rhythm: Regular Rhythm  Sounds: Normal     Abdomen:  Abdomen Findings:  Normal, Soft, Nontender        Zarina Robins RN  12/4/17    Anesthesia Plan  Type of Anesthesia, risks & benefits discussed:  Anesthesia Type:  general, MAC  Patient's Preference: as indicated  Intra-op Monitoring Plan: standard ASA monitors  Intra-op Monitoring Plan Comments:   Post Op Pain Control Plan: multimodal analgesia  Post Op Pain Control Plan Comments:   Induction:   IV  Beta Blocker:  Patient is not currently on a Beta-Blocker (No further documentation required).       Informed Consent: Patient understands risks and agrees with Anesthesia plan.  Questions answered. Anesthesia consent signed with patient.  ASA Score: 3     Day of Surgery Review of History & Physical:    H&P update referred to the surgeon.     Anesthesia Plan Notes: Reassurance given.        Ready For Surgery From Anesthesia  Perspective.

## 2017-12-11 ENCOUNTER — OFFICE VISIT (OUTPATIENT)
Dept: SURGERY | Facility: CLINIC | Age: 37
End: 2017-12-11
Payer: COMMERCIAL

## 2017-12-11 VITALS
BODY MASS INDEX: 30.27 KG/M2 | HEIGHT: 65 IN | HEART RATE: 65 BPM | DIASTOLIC BLOOD PRESSURE: 83 MMHG | WEIGHT: 181.69 LBS | SYSTOLIC BLOOD PRESSURE: 132 MMHG

## 2017-12-11 DIAGNOSIS — K60.3 FISTULA-IN-ANO: Primary | ICD-10-CM

## 2017-12-11 PROCEDURE — 99499 UNLISTED E&M SERVICE: CPT | Mod: S$GLB,,, | Performed by: COLON & RECTAL SURGERY

## 2017-12-11 PROCEDURE — 99999 PR PBB SHADOW E&M-EST. PATIENT-LVL II: CPT | Mod: PBBFAC,,, | Performed by: COLON & RECTAL SURGERY

## 2017-12-13 ENCOUNTER — ANESTHESIA (OUTPATIENT)
Dept: SURGERY | Facility: HOSPITAL | Age: 37
End: 2017-12-13
Payer: COMMERCIAL

## 2017-12-13 ENCOUNTER — HOSPITAL ENCOUNTER (OUTPATIENT)
Facility: HOSPITAL | Age: 37
Discharge: HOME | End: 2017-12-13
Attending: COLON & RECTAL SURGERY | Admitting: COLON & RECTAL SURGERY
Payer: COMMERCIAL

## 2017-12-13 ENCOUNTER — SURGERY (OUTPATIENT)
Age: 37
End: 2017-12-13

## 2017-12-13 VITALS
BODY MASS INDEX: 29.82 KG/M2 | DIASTOLIC BLOOD PRESSURE: 70 MMHG | WEIGHT: 179 LBS | HEART RATE: 71 BPM | OXYGEN SATURATION: 99 % | SYSTOLIC BLOOD PRESSURE: 110 MMHG | HEIGHT: 65 IN | TEMPERATURE: 99 F | RESPIRATION RATE: 16 BRPM

## 2017-12-13 DIAGNOSIS — K60.3 FISTULA-IN-ANO: ICD-10-CM

## 2017-12-13 LAB
B-HCG UR QL: NEGATIVE
CTP QC/QA: YES

## 2017-12-13 PROCEDURE — D9220A PRA ANESTHESIA: Mod: CRNA,,, | Performed by: NURSE ANESTHETIST, CERTIFIED REGISTERED

## 2017-12-13 PROCEDURE — 63600175 PHARM REV CODE 636 W HCPCS: Performed by: NURSE ANESTHETIST, CERTIFIED REGISTERED

## 2017-12-13 PROCEDURE — 25000003 PHARM REV CODE 250: Performed by: NURSE PRACTITIONER

## 2017-12-13 PROCEDURE — 46030 REMOVAL ANAL SETON OTH MRK: CPT | Mod: ,,, | Performed by: COLON & RECTAL SURGERY

## 2017-12-13 PROCEDURE — 71000015 HC POSTOP RECOV 1ST HR: Performed by: COLON & RECTAL SURGERY

## 2017-12-13 PROCEDURE — S0028 INJECTION, FAMOTIDINE, 20 MG: HCPCS | Performed by: NURSE ANESTHETIST, CERTIFIED REGISTERED

## 2017-12-13 PROCEDURE — 94761 N-INVAS EAR/PLS OXIMETRY MLT: CPT

## 2017-12-13 PROCEDURE — 25000003 PHARM REV CODE 250: Performed by: COLON & RECTAL SURGERY

## 2017-12-13 PROCEDURE — 25000003 PHARM REV CODE 250: Performed by: SURGERY

## 2017-12-13 PROCEDURE — 63600175 PHARM REV CODE 636 W HCPCS: Performed by: STUDENT IN AN ORGANIZED HEALTH CARE EDUCATION/TRAINING PROGRAM

## 2017-12-13 PROCEDURE — 71000039 HC RECOVERY, EACH ADD'L HOUR: Performed by: COLON & RECTAL SURGERY

## 2017-12-13 PROCEDURE — 25000003 PHARM REV CODE 250: Performed by: NURSE ANESTHETIST, CERTIFIED REGISTERED

## 2017-12-13 PROCEDURE — 25000003 PHARM REV CODE 250: Performed by: STUDENT IN AN ORGANIZED HEALTH CARE EDUCATION/TRAINING PROGRAM

## 2017-12-13 PROCEDURE — 71000016 HC POSTOP RECOV ADDL HR: Performed by: COLON & RECTAL SURGERY

## 2017-12-13 PROCEDURE — 63600175 PHARM REV CODE 636 W HCPCS: Performed by: ANESTHESIOLOGY

## 2017-12-13 PROCEDURE — 81025 URINE PREGNANCY TEST: CPT | Performed by: COLON & RECTAL SURGERY

## 2017-12-13 PROCEDURE — C9290 INJ, BUPIVACAINE LIPOSOME: HCPCS | Performed by: COLON & RECTAL SURGERY

## 2017-12-13 PROCEDURE — 37000009 HC ANESTHESIA EA ADD 15 MINS: Performed by: COLON & RECTAL SURGERY

## 2017-12-13 PROCEDURE — D9220A PRA ANESTHESIA: Mod: ANES,,, | Performed by: ANESTHESIOLOGY

## 2017-12-13 PROCEDURE — 71000033 HC RECOVERY, INTIAL HOUR: Performed by: COLON & RECTAL SURGERY

## 2017-12-13 PROCEDURE — 36000705 HC OR TIME LEV I EA ADD 15 MIN: Performed by: COLON & RECTAL SURGERY

## 2017-12-13 PROCEDURE — 27000221 HC OXYGEN, UP TO 24 HOURS

## 2017-12-13 PROCEDURE — 63600175 PHARM REV CODE 636 W HCPCS: Performed by: COLON & RECTAL SURGERY

## 2017-12-13 PROCEDURE — 37000008 HC ANESTHESIA 1ST 15 MINUTES: Performed by: COLON & RECTAL SURGERY

## 2017-12-13 PROCEDURE — 36000704 HC OR TIME LEV I 1ST 15 MIN: Performed by: COLON & RECTAL SURGERY

## 2017-12-13 PROCEDURE — 63600175 PHARM REV CODE 636 W HCPCS: Performed by: NURSE PRACTITIONER

## 2017-12-13 RX ORDER — LIDOCAINE HCL/PF 100 MG/5ML
SYRINGE (ML) INTRAVENOUS
Status: DISCONTINUED | OUTPATIENT
Start: 2017-12-13 | End: 2017-12-13

## 2017-12-13 RX ORDER — MIDAZOLAM HYDROCHLORIDE 1 MG/ML
INJECTION, SOLUTION INTRAMUSCULAR; INTRAVENOUS
Status: DISCONTINUED | OUTPATIENT
Start: 2017-12-13 | End: 2017-12-13

## 2017-12-13 RX ORDER — IBUPROFEN 600 MG/1
600 TABLET ORAL 3 TIMES DAILY
Qty: 60 TABLET | Refills: 1 | Status: SHIPPED | OUTPATIENT
Start: 2017-12-13 | End: 2017-12-13

## 2017-12-13 RX ORDER — ONDANSETRON 2 MG/ML
INJECTION INTRAMUSCULAR; INTRAVENOUS
Status: DISCONTINUED | OUTPATIENT
Start: 2017-12-13 | End: 2017-12-13

## 2017-12-13 RX ORDER — ACETAMINOPHEN 500 MG
1000 TABLET ORAL EVERY 6 HOURS
Refills: 0 | COMMUNITY
Start: 2017-12-13 | End: 2018-01-05

## 2017-12-13 RX ORDER — OXYCODONE HYDROCHLORIDE 5 MG/1
5 TABLET ORAL EVERY 4 HOURS PRN
Qty: 40 TABLET | Refills: 0 | Status: SHIPPED | OUTPATIENT
Start: 2017-12-13 | End: 2017-12-13

## 2017-12-13 RX ORDER — OXYCODONE HYDROCHLORIDE 5 MG/1
5 TABLET ORAL EVERY 4 HOURS PRN
Qty: 40 TABLET | Refills: 0 | Status: ON HOLD | OUTPATIENT
Start: 2017-12-13 | End: 2017-12-22 | Stop reason: HOSPADM

## 2017-12-13 RX ORDER — SODIUM CHLORIDE 9 MG/ML
INJECTION, SOLUTION INTRAVENOUS CONTINUOUS
Status: DISCONTINUED | OUTPATIENT
Start: 2017-12-13 | End: 2017-12-13 | Stop reason: HOSPADM

## 2017-12-13 RX ORDER — IBUPROFEN 800 MG/1
800 TABLET ORAL 3 TIMES DAILY
Qty: 60 TABLET | Refills: 1 | Status: SHIPPED | OUTPATIENT
Start: 2017-12-13 | End: 2018-01-05

## 2017-12-13 RX ORDER — BUPIVACAINE HYDROCHLORIDE 2.5 MG/ML
INJECTION, SOLUTION EPIDURAL; INFILTRATION; INTRACAUDAL
Status: DISCONTINUED | OUTPATIENT
Start: 2017-12-13 | End: 2017-12-13 | Stop reason: HOSPADM

## 2017-12-13 RX ORDER — HYDROMORPHONE HYDROCHLORIDE 2 MG/ML
0.2 INJECTION, SOLUTION INTRAMUSCULAR; INTRAVENOUS; SUBCUTANEOUS EVERY 5 MIN PRN
Status: DISCONTINUED | OUTPATIENT
Start: 2017-12-13 | End: 2017-12-13 | Stop reason: HOSPADM

## 2017-12-13 RX ORDER — PROPOFOL 10 MG/ML
VIAL (ML) INTRAVENOUS
Status: DISCONTINUED | OUTPATIENT
Start: 2017-12-13 | End: 2017-12-13

## 2017-12-13 RX ORDER — PROMETHAZINE HYDROCHLORIDE 25 MG/ML
INJECTION, SOLUTION INTRAMUSCULAR; INTRAVENOUS
Status: DISCONTINUED
Start: 2017-12-13 | End: 2017-12-13 | Stop reason: HOSPADM

## 2017-12-13 RX ORDER — ROCURONIUM BROMIDE 10 MG/ML
INJECTION, SOLUTION INTRAVENOUS
Status: DISCONTINUED | OUTPATIENT
Start: 2017-12-13 | End: 2017-12-13

## 2017-12-13 RX ORDER — FENTANYL CITRATE 50 UG/ML
INJECTION, SOLUTION INTRAMUSCULAR; INTRAVENOUS
Status: DISCONTINUED | OUTPATIENT
Start: 2017-12-13 | End: 2017-12-13

## 2017-12-13 RX ORDER — FAMOTIDINE 10 MG/ML
INJECTION INTRAVENOUS
Status: DISCONTINUED | OUTPATIENT
Start: 2017-12-13 | End: 2017-12-13

## 2017-12-13 RX ORDER — SUCCINYLCHOLINE CHLORIDE 20 MG/ML
INJECTION INTRAMUSCULAR; INTRAVENOUS
Status: DISCONTINUED | OUTPATIENT
Start: 2017-12-13 | End: 2017-12-13

## 2017-12-13 RX ORDER — ONDANSETRON 2 MG/ML
4 INJECTION INTRAMUSCULAR; INTRAVENOUS ONCE AS NEEDED
Status: DISCONTINUED | OUTPATIENT
Start: 2017-12-13 | End: 2017-12-13 | Stop reason: HOSPADM

## 2017-12-13 RX ORDER — MUPIROCIN 20 MG/G
OINTMENT TOPICAL
Status: DISCONTINUED | OUTPATIENT
Start: 2017-12-13 | End: 2017-12-13 | Stop reason: HOSPADM

## 2017-12-13 RX ORDER — DIPHENHYDRAMINE HYDROCHLORIDE 50 MG/ML
25 INJECTION INTRAMUSCULAR; INTRAVENOUS EVERY 6 HOURS PRN
Status: DISCONTINUED | OUTPATIENT
Start: 2017-12-13 | End: 2017-12-13 | Stop reason: HOSPADM

## 2017-12-13 RX ORDER — LIDOCAINE HYDROCHLORIDE 10 MG/ML
1 INJECTION, SOLUTION EPIDURAL; INFILTRATION; INTRACAUDAL; PERINEURAL ONCE
Status: COMPLETED | OUTPATIENT
Start: 2017-12-13 | End: 2017-12-13

## 2017-12-13 RX ORDER — ACETAMINOPHEN 10 MG/ML
1000 INJECTION, SOLUTION INTRAVENOUS
Status: COMPLETED | OUTPATIENT
Start: 2017-12-13 | End: 2017-12-13

## 2017-12-13 RX ORDER — HYDROMORPHONE HYDROCHLORIDE 2 MG/ML
1 INJECTION, SOLUTION INTRAMUSCULAR; INTRAVENOUS; SUBCUTANEOUS EVERY 4 HOURS PRN
Status: DISCONTINUED | OUTPATIENT
Start: 2017-12-13 | End: 2017-12-13 | Stop reason: HOSPADM

## 2017-12-13 RX ORDER — DEXTROMETHORPHAN HYDROBROMIDE, GUAIFENESIN 5; 100 MG/5ML; MG/5ML
650 LIQUID ORAL EVERY 8 HOURS
Refills: 0 | COMMUNITY
Start: 2017-12-13 | End: 2017-12-13 | Stop reason: HOSPADM

## 2017-12-13 RX ORDER — HYDROCODONE BITARTRATE AND ACETAMINOPHEN 5; 325 MG/1; MG/1
1 TABLET ORAL EVERY 4 HOURS PRN
Status: DISCONTINUED | OUTPATIENT
Start: 2017-12-13 | End: 2017-12-13 | Stop reason: HOSPADM

## 2017-12-13 RX ADMIN — HYDROMORPHONE HYDROCHLORIDE 0.2 MG: 2 INJECTION INTRAMUSCULAR; INTRAVENOUS; SUBCUTANEOUS at 04:12

## 2017-12-13 RX ADMIN — SODIUM CHLORIDE: 0.9 INJECTION, SOLUTION INTRAVENOUS at 10:12

## 2017-12-13 RX ADMIN — SUCCINYLCHOLINE CHLORIDE 120 MG: 20 INJECTION, SOLUTION INTRAMUSCULAR; INTRAVENOUS at 02:12

## 2017-12-13 RX ADMIN — FAMOTIDINE 20 MG: 10 INJECTION, SOLUTION INTRAVENOUS at 02:12

## 2017-12-13 RX ADMIN — FENTANYL CITRATE 100 MCG: 50 INJECTION, SOLUTION INTRAMUSCULAR; INTRAVENOUS at 02:12

## 2017-12-13 RX ADMIN — PROMETHAZINE HYDROCHLORIDE 6.25 MG: 25 INJECTION INTRAMUSCULAR; INTRAVENOUS at 05:12

## 2017-12-13 RX ADMIN — IBUPROFEN 800 MG: 800 INJECTION INTRAVENOUS at 11:12

## 2017-12-13 RX ADMIN — FENTANYL CITRATE 50 MCG: 50 INJECTION, SOLUTION INTRAMUSCULAR; INTRAVENOUS at 03:12

## 2017-12-13 RX ADMIN — PROPOFOL 150 MG: 10 INJECTION, EMULSION INTRAVENOUS at 02:12

## 2017-12-13 RX ADMIN — LIDOCAINE HYDROCHLORIDE 0.2 MG: 10 INJECTION, SOLUTION EPIDURAL; INFILTRATION; INTRACAUDAL; PERINEURAL at 10:12

## 2017-12-13 RX ADMIN — HYDROCODONE BITARTRATE AND ACETAMINOPHEN 1 TABLET: 5; 325 TABLET ORAL at 04:12

## 2017-12-13 RX ADMIN — FENTANYL CITRATE 25 MCG: 50 INJECTION, SOLUTION INTRAMUSCULAR; INTRAVENOUS at 03:12

## 2017-12-13 RX ADMIN — BUPIVACAINE HYDROCHLORIDE 20 ML: 2.5 INJECTION, SOLUTION EPIDURAL; INFILTRATION; INTRACAUDAL; PERINEURAL at 03:12

## 2017-12-13 RX ADMIN — MIDAZOLAM HYDROCHLORIDE 2 MG: 1 INJECTION, SOLUTION INTRAMUSCULAR; INTRAVENOUS at 02:12

## 2017-12-13 RX ADMIN — BUPIVACAINE 20 ML: 13.3 INJECTION, SUSPENSION, LIPOSOMAL INFILTRATION at 03:12

## 2017-12-13 RX ADMIN — LIDOCAINE HYDROCHLORIDE 100 MG: 20 INJECTION, SOLUTION INTRAVENOUS at 02:12

## 2017-12-13 RX ADMIN — ROCURONIUM BROMIDE 10 MG: 10 INJECTION, SOLUTION INTRAVENOUS at 02:12

## 2017-12-13 RX ADMIN — ONDANSETRON 8 MG: 2 INJECTION INTRAMUSCULAR; INTRAVENOUS at 03:12

## 2017-12-13 RX ADMIN — ACETAMINOPHEN 1000 MG: 10 INJECTION, SOLUTION INTRAVENOUS at 11:12

## 2017-12-13 RX ADMIN — PROPOFOL 50 MG: 10 INJECTION, EMULSION INTRAVENOUS at 02:12

## 2017-12-13 RX ADMIN — SODIUM CHLORIDE, SODIUM GLUCONATE, SODIUM ACETATE, POTASSIUM CHLORIDE, MAGNESIUM CHLORIDE, SODIUM PHOSPHATE, DIBASIC, AND POTASSIUM PHOSPHATE: .53; .5; .37; .037; .03; .012; .00082 INJECTION, SOLUTION INTRAVENOUS at 03:12

## 2017-12-13 RX ADMIN — MUPIROCIN: 20 OINTMENT TOPICAL at 11:12

## 2017-12-13 NOTE — BRIEF OP NOTE
Id: [id number]   [facility]  Brief Operative Note     SUMMARY     Surgery Date: 12/13/2017     Surgeon(s) and Role:     * Kirk Lima MD - Primary    Assisting Surgeon: Melanie Cruz MD-fellow    Pre-op Diagnosis:  Fistula-in-ano [K60.3]    Post-op Diagnosis:  Post-Op Diagnosis Codes:     * Fistula-in-ano [K60.3]    Procedure(s) (LRB):  EXAM UNDER ANESTHESIA with ovesco clip placement (N/A)    Anesthesia: General/MAC    Description of the findings of the procedure: transsphincteric anterior fistula. No evidence of infection. Internal opening closed and ovesco clip placed    Findings/Key Components: as above    Estimated Blood Loss: minimal         Specimens:   Specimen (12h ago through future)    None          Discharge Note    SUMMARY     Admit Date: 12/13/2017    Discharge Date and Time:  12/13/2017 3:54 PM    Hospital Course (synopsis of major diagnoses, care, treatment, and services provided during the course of the hospital stay): Underwent elective procedure as above. Tolerated well without complications.      Final Diagnosis: Post-Op Diagnosis Codes:     * Fistula-in-ano [K60.3]    Disposition: Home or Self Care    Follow Up/Patient Instructions:     Medications:  Reconciled Home Medications:   Current Discharge Medication List      START taking these medications    Details   ibuprofen (ADVIL,MOTRIN) 800 MG tablet Take 1 tablet (800 mg total) by mouth 3 (three) times daily.  Qty: 60 tablet, Refills: 1      oxyCODONE (ROXICODONE) 5 MG immediate release tablet Take 1 tablet (5 mg total) by mouth every 4 (four) hours as needed for Pain.  Qty: 40 tablet, Refills: 0         CONTINUE these medications which have CHANGED    Details   acetaminophen (TYLENOL) 500 MG tablet Take 2 tablets (1,000 mg total) by mouth every 6 (six) hours.  Refills: 0         CONTINUE these medications which have NOT CHANGED    Details   MULTIVIT WITH CALCIUM,IRON,MIN (WOMEN'S MULTIPLE VITAMINS ORAL) Take by mouth.       LORazepam (ATIVAN) 0.5 MG tablet Take 0.5 mg by mouth every 6 (six) hours as needed for Anxiety.             Discharge Procedure Orders  Diet general     Call MD for:  temperature >100.4     Call MD for:  persistent nausea and vomiting     Call MD for:  severe uncontrolled pain     Call MD for:  redness, tenderness, or signs of infection (pain, swelling, redness, odor or green/yellow discharge around incision site)     Call MD for:   Scheduling Instructions: Inability to urinate     Post anorectal surgery instructions:     Some bleeding is expected.   You have packing which will be expelled with the next bowel movement.   Do Sitz Baths or tub soaks three times a day in warm water    Take stool softeners or laxatives to keep the stool soft. The goal is 1-2 nonstraining nonloose bowel movements per day.    Take fiber supplements such as Metamucil, Citrucel, Konsyl, etc. We recommend 25-30 grams of fiber daily or reaching the above bowel movement goal. Stay hydrated - drink 8x 8 ounce glasses of water a day.     Follow-up Information     Kirk Lima MD. Schedule an appointment as soon as possible for a visit in 3 weeks.    Specialty:  Colon and Rectal Surgery  Contact information:  [address]  [facility] [address] [phone]

## 2017-12-13 NOTE — TRANSFER OF CARE
"Anesthesia Transfer of Care Note    Patient: Susan Anaya    Procedure(s) Performed: Procedure(s) (LRB):  EXAM UNDER ANESTHESIA with ovesco clip placement (N/A)    Patient location: PACU    Anesthesia Type: general    Transport from OR: Transported from OR on 6-10 L/min O2 by face mask with adequate spontaneous ventilation    Post pain: adequate analgesia    Post assessment: no apparent anesthetic complications and tolerated procedure well    Post vital signs: stable    Level of consciousness: awake and alert    Nausea/Vomiting: no nausea/vomiting    Complications: none    Transfer of care protocol was followed      Last vitals:   Visit Vitals  /57   Pulse 90   Temp 36.8 °C (98.3 °F) (Oral)   Resp 18   Ht 5' 5" (1.651 m)   Wt 81.2 kg (179 lb)   LMP 11/23/2017 (Approximate)   SpO2 100%   Breastfeeding? No   BMI 29.79 kg/m²     "

## 2017-12-13 NOTE — INTERVAL H&P NOTE
The patient has been examined and the H&P has been reviewed:    I concur with the findings and no changes have occurred since H&P was written.    Anesthesia/Surgery risks, benefits and alternative options discussed and understood by patient/family.          Active Hospital Problems    Diagnosis  POA    Fistula-in-ano [K60.3]  Yes      Resolved Hospital Problems    Diagnosis Date Resolved POA   No resolved problems to display.

## 2017-12-14 ENCOUNTER — TELEPHONE (OUTPATIENT)
Dept: SURGERY | Facility: CLINIC | Age: 37
End: 2017-12-14

## 2017-12-14 NOTE — TELEPHONE ENCOUNTER
Spoke with patient.  I instructed her to take to Oxycodone then take Ibuprofen for breakthrough pain until her next dose is due.

## 2017-12-14 NOTE — PROGRESS NOTES
Discussed option of OVESCO clip vs LIFT.  Unproven new technology vs 60-70% healing with additional wound.  Will proceed with OVESCO

## 2017-12-14 NOTE — ANESTHESIA POSTPROCEDURE EVALUATION
"Anesthesia Post Evaluation    Patient: Susan Anaya    Procedure(s) Performed: Procedure(s) (LRB):  EXAM UNDER ANESTHESIA with ovesco clip placement (N/A)    Final Anesthesia Type: general  Patient location during evaluation: PACU  Patient participation: Yes- Able to Participate  Level of consciousness: awake and alert  Post-procedure vital signs: reviewed and stable  Pain management: adequate  Airway patency: patent  PONV status at discharge: No PONV  Anesthetic complications: no      Cardiovascular status: blood pressure returned to baseline  Respiratory status: unassisted  Hydration status: euvolemic  Follow-up not needed.        Visit Vitals  /70   Pulse 71   Temp 37.1 °C (98.8 °F) (Temporal)   Resp 16   Ht 5' 5" (1.651 m)   Wt 81.2 kg (179 lb)   LMP 11/23/2017 (Approximate)   SpO2 99%   Breastfeeding? No   BMI 29.79 kg/m²       Pain/Rashida Score: Pain Assessment Performed: Yes (12/13/2017  6:30 PM)  Presence of Pain: denies (12/13/2017  6:30 PM)  Pain Rating Prior to Med Admin: 5 (12/13/2017  4:45 PM)  Pain Rating Post Med Admin: 3 (12/13/2017  5:12 PM)  Rashida Score: 10 (12/13/2017  5:32 PM)      "

## 2017-12-14 NOTE — DISCHARGE INSTRUCTIONS
Post anorectal surgery instructions:     Some bleeding is expected.   You have packing which will be expelled with the next bowel movement.   Do Sitz Baths or tub soaks three times a day in warm water    Take stool softeners or laxatives to keep the stool soft. The goal is 1-2 nonstraining nonloose bowel movements per day.    Take fiber supplements such as Metamucil, Citrucel, Konsyl, etc. We recommend 25-30 grams of fiber daily or reaching the above bowel movement goal. Stay hydrated - drink 8x 8 ounce glasses of water a day.       Recovery After Procedural Sedation (Adult)  You have been given medicine by vein to make you sleep during your surgery. This may have included both a pain medicine and sleeping medicine. Most of the effects have worn off. But you may still have some drowsiness for the next 6 to 8 hours.  Home care  Follow these guidelines when you get home:  · For the next 8 hours, you should be watched by a responsible adult. This person should make sure your condition is not getting worse.  · Don't drink any alcohol for the next 24 hours.  · Don't drive, operate dangerous machinery, or make important business or personal decisions during the next 24 hours.  Note: Your healthcare provider may tell you not to take any medicine by mouth for pain or sleep in the next 4 hours. These medicines may react with the medicines you were given in the hospital. This could cause a much stronger response than usual.  Follow-up care  Follow up with your healthcare provider if you are not alert and back to your usual level of activity within 12 hours.  When to seek medical advice  Call your healthcare provider right away if any of these occur:  · Drowsiness gets worse  · Weakness or dizziness gets worse  · Repeated vomiting  · You can't be awakened   Date Last Reviewed: 10/18/2016  © 7099-3074 [employer]. [address] [place] [address], [place] [address]. All rights reserved. This information is not intended as a  substitute for professional medical care. Always follow your healthcare professional's instructions.

## 2017-12-14 NOTE — TELEPHONE ENCOUNTER
----- Message from Coco Pritchard sent at 12/14/2017 10:15 AM CST -----  Contact: PT#[phone]  Pt has questions about discharge instructions. please call

## 2017-12-14 NOTE — PLAN OF CARE
Patient and spouse state they are ready to be discharged. Instructions and narcotic prescription given to patient and family. Both verbalize understanding. Patient tolerating po liquids with no difficulty. Patient states pain is at a tolerable level for them. Anesthesia consent and surgical consent in chart upon patient's discharge from [facility].

## 2017-12-15 NOTE — OP NOTE
DATE OF PROCEDURE:  12/13/2017    ATTENDING SURGEON:  Kirk Lima M.D.    ASSISTANT:  Kim Cruz M.D., Fellow    PROCEDURE PERFORMED:  Exam under anesthesia with Ovesco clip placement.    PREOPERATIVE DIAGNOSIS:  Fistula-in-ano.    POSTOPERATIVE DIAGNOSIS:  Fistula-in-ano.    ANESTHESIA:  General MAC and Exparel block.    COMPLICATIONS:  None apparent.    ESTIMATED BLOOD LOSS:  Minimal.    SPECIMENS:  None.    INDICATIONS OF THE PROCEDURE:  Susan Anaya is a 37-year-old female with   history of fistula-in-ano, status post previous exam under anesthesia and seton   placement, 11/07/2017.  She has been doing well since without any evidence of   infection.  She had an anterior fistula.  Therefore, discussion was held with   the patient regarding the optimal surgical management given the location and   that the fistula was transsphincteric.  Discussion was held with the patient   regarding Ovesco clip placement versus a LIFT procedure.  The plan was for   placement of Ovesco clip and LIFT if this was not successful.  Risks, benefits   and alternatives were explained to the patient and consent was obtained.    FINDINGS OF THE PROCEDURE:  Transsphincteric anterior fistula with internal   opening in midline.  No evidence of active infection.  The internal opening was   closed and Ovesco clip was in good placement.    DESCRIPTION OF THE PROCEDURE IN DETAIL:  The patient was identified in the   preoperative holding area and brought to the Operating Room where general   endotracheal anesthesia was administered.  The patient did not receive   preoperative antibiotics and a Casillas was not placed.  Once the patient was   asleep, she was placed in the prone jackknife position on the operating table.    A preoperative surgical safety timeout was performed.  The perianal area was   examined and the external opening was noted to have the seton still in place.    There is no evidence of active infection or abscess.  The  internal opening was   noted to be just to the right of the midline in the anterior position.  The rest   of the anal canal was then examined and there is no evidence of any other   internal openings or pathology.  The seton was then removed.  Given the location   and involvement of sphincter muscle, the decision was made for Ovesco clip   placement.  We then proceeded by taking a circumferential ring of mucosa and   dissecting it off of the internal opening of the fistula.  The internal opening   was then closed with two figure-of-eight 0 Vicryl sutures, which were then used   to help deploy the Ovesco clip placement.  Prior to doing this, the tract was   curetted with the brush that comes with the Ovesco clip.  Once the sutures were   in good place, we did tie the sutures down as it closed the internal opening   nicely.  The sutures were then tied at their ends and threaded through the   Ovesco clip device.  The clip was then deployed taking care not to involve any   mucosa in the clip itself.  There was excellent approximation of the internal   opening with the clip placement.  The external opening was then extended to open   a little bit more and allow for more drainage externally.  This was done and   there was again no evidence of infection.  An anal block was then performed   using Exparel and also injecting around the external opening.  The patient   tolerated the procedure well.  She was extubated and taken to Postanesthesia   Recovery Unit in stable condition.    The patient's attending surgeon, Dr. Kirk Lima was present and scrubbed   for the duration of the procedure.      NEW/HN  dd: 12/14/2017 13:40:04 (CST)  td: 12/14/2017 20:14:09 (CST)  Doc ID   #[id number]  Job ID #[id number]    CC:

## 2017-12-17 ENCOUNTER — NURSE TRIAGE (OUTPATIENT)
Dept: ADMINISTRATIVE | Facility: CLINIC | Age: 37
End: 2017-12-17

## 2017-12-17 PROCEDURE — 99284 EMERGENCY DEPT VISIT MOD MDM: CPT | Mod: 25

## 2017-12-17 PROCEDURE — 99284 EMERGENCY DEPT VISIT MOD MDM: CPT | Mod: ,,, | Performed by: EMERGENCY MEDICINE

## 2017-12-18 ENCOUNTER — TELEPHONE (OUTPATIENT)
Dept: SURGERY | Facility: CLINIC | Age: 37
End: 2017-12-18

## 2017-12-18 ENCOUNTER — HOSPITAL ENCOUNTER (EMERGENCY)
Facility: HOSPITAL | Age: 37
Discharge: HOME | End: 2017-12-18
Attending: EMERGENCY MEDICINE
Payer: COMMERCIAL

## 2017-12-18 VITALS
HEART RATE: 76 BPM | SYSTOLIC BLOOD PRESSURE: 116 MMHG | RESPIRATION RATE: 19 BRPM | DIASTOLIC BLOOD PRESSURE: 58 MMHG | TEMPERATURE: 99 F | HEIGHT: 65 IN | WEIGHT: 178 LBS | OXYGEN SATURATION: 98 % | BODY MASS INDEX: 29.66 KG/M2

## 2017-12-18 DIAGNOSIS — J06.9 UPPER RESPIRATORY TRACT INFECTION, UNSPECIFIED TYPE: Primary | ICD-10-CM

## 2017-12-18 LAB
ALBUMIN SERPL BCP-MCNC: 3.9 G/DL
ALP SERPL-CCNC: 74 U/L
ALT SERPL W/O P-5'-P-CCNC: 15 U/L
ANION GAP SERPL CALC-SCNC: 10 MMOL/L
AST SERPL-CCNC: 17 U/L
B-HCG UR QL: NEGATIVE
BACTERIA #/AREA URNS AUTO: NORMAL /HPF
BASOPHILS # BLD AUTO: 0.03 K/UL
BASOPHILS NFR BLD: 0.4 %
BILIRUB SERPL-MCNC: 0.6 MG/DL
BILIRUB UR QL STRIP: NEGATIVE
BUN SERPL-MCNC: 19 MG/DL
CALCIUM SERPL-MCNC: 9.5 MG/DL
CHLORIDE SERPL-SCNC: 110 MMOL/L
CLARITY UR REFRACT.AUTO: CLEAR
CO2 SERPL-SCNC: 20 MMOL/L
COLOR UR AUTO: ABNORMAL
CREAT SERPL-MCNC: 0.9 MG/DL
CTP QC/QA: YES
DIFFERENTIAL METHOD: ABNORMAL
EOSINOPHIL # BLD AUTO: 0.2 K/UL
EOSINOPHIL NFR BLD: 2 %
ERYTHROCYTE [DISTWIDTH] IN BLOOD BY AUTOMATED COUNT: 12.1 %
EST. GFR  (AFRICAN AMERICAN): >60 ML/MIN/1.73 M^2
EST. GFR  (NON AFRICAN AMERICAN): >60 ML/MIN/1.73 M^2
FLUAV AG SPEC QL IA: NEGATIVE
FLUBV AG SPEC QL IA: NEGATIVE
GLUCOSE SERPL-MCNC: 106 MG/DL
GLUCOSE UR QL STRIP: NEGATIVE
HCT VFR BLD AUTO: 38.2 %
HGB BLD-MCNC: 12.9 G/DL
HGB UR QL STRIP: ABNORMAL
IMM GRANULOCYTES # BLD AUTO: 0.03 K/UL
IMM GRANULOCYTES NFR BLD AUTO: 0.4 %
KETONES UR QL STRIP: NEGATIVE
LEUKOCYTE ESTERASE UR QL STRIP: ABNORMAL
LYMPHOCYTES # BLD AUTO: 0.9 K/UL
LYMPHOCYTES NFR BLD: 11.7 %
MCH RBC QN AUTO: 27.3 PG
MCHC RBC AUTO-ENTMCNC: 33.8 G/DL
MCV RBC AUTO: 81 FL
MICROSCOPIC COMMENT: NORMAL
MONOCYTES # BLD AUTO: 0.5 K/UL
MONOCYTES NFR BLD: 6.1 %
NEUTROPHILS # BLD AUTO: 5.9 K/UL
NEUTROPHILS NFR BLD: 79.4 %
NITRITE UR QL STRIP: NEGATIVE
NRBC BLD-RTO: 0 /100 WBC
PH UR STRIP: 5 [PH] (ref 5–8)
PLATELET # BLD AUTO: 212 K/UL
PMV BLD AUTO: 9.5 FL
POTASSIUM SERPL-SCNC: 4.1 MMOL/L
PROT SERPL-MCNC: 7.6 G/DL
PROT UR QL STRIP: NEGATIVE
RBC # BLD AUTO: 4.72 M/UL
RBC #/AREA URNS AUTO: 2 /HPF (ref 0–4)
SODIUM SERPL-SCNC: 140 MMOL/L
SP GR UR STRIP: 1.01 (ref 1–1.03)
SPECIMEN SOURCE: NORMAL
SQUAMOUS #/AREA URNS AUTO: 1 /HPF
URN SPEC COLLECT METH UR: ABNORMAL
UROBILINOGEN UR STRIP-ACNC: NEGATIVE EU/DL
WBC # BLD AUTO: 7.49 K/UL
WBC #/AREA URNS AUTO: 0 /HPF (ref 0–5)

## 2017-12-18 PROCEDURE — 87400 INFLUENZA A/B EACH AG IA: CPT

## 2017-12-18 PROCEDURE — 25500020 PHARM REV CODE 255: Performed by: EMERGENCY MEDICINE

## 2017-12-18 PROCEDURE — 81025 URINE PREGNANCY TEST: CPT | Performed by: EMERGENCY MEDICINE

## 2017-12-18 PROCEDURE — 25000003 PHARM REV CODE 250: Performed by: EMERGENCY MEDICINE

## 2017-12-18 PROCEDURE — 80053 COMPREHEN METABOLIC PANEL: CPT

## 2017-12-18 PROCEDURE — 85025 COMPLETE CBC W/AUTO DIFF WBC: CPT

## 2017-12-18 PROCEDURE — 81001 URINALYSIS AUTO W/SCOPE: CPT

## 2017-12-18 RX ORDER — ACETAMINOPHEN 500 MG
1000 TABLET ORAL
Status: COMPLETED | OUTPATIENT
Start: 2017-12-18 | End: 2017-12-18

## 2017-12-18 RX ADMIN — IOHEXOL 75 ML: 350 INJECTION, SOLUTION INTRAVENOUS at 04:12

## 2017-12-18 RX ADMIN — ACETAMINOPHEN 1000 MG: 500 TABLET ORAL at 02:12

## 2017-12-18 NOTE — TELEPHONE ENCOUNTER
----- Message from Ivon Early sent at 12/18/2017  2:10 PM CST -----  Contact: Pt:[phone]  Pt called and states she would like to speak with Nurse Arlene Haq in regards to updating her on the status of how she is doing.

## 2017-12-18 NOTE — ED PROVIDER NOTES
"Encounter Date: 2017    SCRIBE #1 NOTE: I, Samuel Mcconnell, am scribing for, and in the presence of,  Dr. Theodore. I have scribed the entire note.       History     Chief Complaint   Patient presents with    Post-op Problem     Fistula clip put in Wednesday, pt now reports fever of 103 at home, chills, dizziness, and vomiting.      Time patient was seen by the provider: 1:49 AM      The patient is a 37 y.o. female with recent repair of anal fistula that presents to the ED with a complaint of fever, shot up to 103 F at 8:00PM yesterday. She notes cough but no trouble with urination. She also notes chills, myalgia, vomiting and dizziness. She also states that her family members have similar symptoms at this time. Her rectal pain is same as after the fistula surgery. She took 600 mg ibuprofen at 9:15 PM for fever with some relief. No other complaints.        The history is provided by the patient and medical records.     Review of patient's allergies indicates:   Allergen Reactions    Tree nuts Anaphylaxis    Steroid [corticosteroids (glucocorticoids)] Other (See Comments)     Muscle weakness dizziness     Past Medical History:   Diagnosis Date    Abnormal Pap smear     Abnormal Pap smear of cervix     Abnormal Pap smear of vagina         Allergy     Asthma     h/o    Celiac disease     gluten sensitivity    General anesthetics causing adverse effect in therapeutic use     PONV (postoperative nausea and vomiting)      Past Surgical History:   Procedure Laterality Date     SECTION      X 2     COSMETIC SURGERY      liposuction onpelvic area    FISTULA REPAIR  2017    Patient reports "ovesco clip" and states it was a trial.    PELVIC LAPAROSCOPY       Family History   Problem Relation Age of Onset    Hypertension Father     Breast cancer Paternal Aunt     Cancer Maternal Grandfather 80     lung ca    Diabetes Maternal Grandfather 60    Hypertension Paternal Grandmother     " Hypertension Paternal Grandfather     Aneurysm Paternal Grandfather     Asthma Son     Hypotension Maternal Grandmother     Breast cancer Maternal Grandmother     Colon cancer Neg Hx     Ovarian cancer Neg Hx     Anesthesia problems Neg Hx      Social History   Substance Use Topics    Smoking status: Never Smoker    Smokeless tobacco: Never Used    Alcohol use No     Review of Systems   Constitutional: Positive for chills and fever.   HENT: Negative for sore throat.    Respiratory: Negative for shortness of breath.    Cardiovascular: Negative for chest pain.   Gastrointestinal: Positive for rectal pain (baseline after fistula repair.) and vomiting. Negative for nausea.   Genitourinary: Negative for dysuria.   Musculoskeletal: Positive for myalgias.   Skin: Negative for rash.   Neurological: Positive for dizziness. Negative for weakness.   Hematological: Does not bruise/bleed easily.       Physical Exam     Initial Vitals [12/17/17 2341]   BP Pulse Resp Temp SpO2   (!) 147/77 92 (!) 22 (!) 101.6 °F (38.7 °C) 99 %      MAP       100.33         Physical Exam    Nursing note and vitals reviewed.  Constitutional: She appears well-developed and well-nourished. No distress.   HENT:   Head: Normocephalic and atraumatic.   Mouth/Throat: Oropharynx is clear and moist.   Eyes: EOM are normal. Pupils are equal, round, and reactive to light.   Neck: Normal range of motion. Neck supple.   Cardiovascular: Normal rate, regular rhythm and normal heart sounds.   No murmur heard.  Pulmonary/Chest: Breath sounds normal. No respiratory distress. She has no wheezes. She has no rales.   Abdominal: Soft. She exhibits no distension. There is no tenderness.   Genitourinary:   Genitourinary Comments: Rectal exam: multiple small external hemorrhoids at 12 o'clock and 0.5 cm, is exclusively tender.About 3 cm open incision anterior to the anus, with no active drainage, no surrounding erythema or induration.       Musculoskeletal:  Normal range of motion. She exhibits no edema.   Neurological: She is alert and oriented to person, place, and time. She has normal strength. No sensory deficit.   Skin: Skin is warm and dry. No rash noted.   Psychiatric: She has a normal mood and affect. Her behavior is normal. Thought content normal.         ED Course   Procedures  Labs Reviewed   COMPREHENSIVE METABOLIC PANEL - Abnormal; Notable for the following:        Result Value    CO2 20 (*)     All other components within normal limits   CBC W/ AUTO DIFFERENTIAL - Abnormal; Notable for the following:     MCV 81 (*)     Lymph # 0.9 (*)     Gran% 79.4 (*)     Lymph% 11.7 (*)     All other components within normal limits   URINALYSIS, REFLEX TO URINE CULTURE - Abnormal; Notable for the following:     Occult Blood UA 2+ (*)     Leukocytes, UA Trace (*)     All other components within normal limits    Narrative:     Preferred Collection Type->Urine, Clean Catch   INFLUENZA A AND B ANTIGEN   URINALYSIS MICROSCOPIC    Narrative:     Preferred Collection Type->Urine, Clean Catch   POCT URINE PREGNANCY             Medical Decision Making:   History:   Old Medical Records: I decided to obtain old medical records.  Old Records Summarized: records from previous admission(s).       <> Summary of Records: Pt with hx of anal fistula followed here by colorectal surgery. On Dec 15, s/p OR with CRS ,she had closure of the internal aspect of the fistula with an ovesco clip.  Initial Assessment:   38 yo f, s/p anal fistula surgery this week, now here with fever/cough/myalgias x 1 day, multiple other family members w similar sx.  Rectal/anal pain stable though pt concerned about new lesion to that region.  On exam, febrile, mildly tachypneic, lungs CTA.  Rectal exam open wound with no signs infection.  ?multiple small external hemorrhoids  Differential Diagnosis:   Fever likely 2/2 influenza vs other URI/virus as no signs post-surgical infection    Clinical Tests:   Lab Tests:  Ordered and Reviewed  ED Management:  -labs  -CRS consult            Scribe Attestation:   Scribe #1: I performed the above scribed service and the documentation accurately describes the services I performed. I attest to the accuracy of the note.            ED Course      3:34 AM  Discussed case with CRS - would like CT abd/pelvis to evaluate for post-op infection  If negative, fine for d/c home    4:53 AM  CT negative  If UA negative, plan for d/c home with supportive care  Outpatient CRS f/u    Clinical Impression:   The encounter diagnosis was Upper respiratory tract infection, unspecified type.          I, Dr. Alea Theodore, personally performed the services described in this documentation. All medical record entries made by the scribe were at my direction and in my presence.  I have reviewed the chart and agree that the record reflects my personal performance and is accurate and complete. Alea Theodore MD.  1:18 PM 12/20/2017                     Alea Theodore MD  12/20/17 [id number]

## 2017-12-18 NOTE — ED NOTES
Appearance: Patient resting comfortably on stretcher, and is in no acute distress at this time. Patient is clean and well groomed, with clothing properly fastened. Patient has no facial grimacing, and no indications of being in pain currently.  Cardiac: Heart sounds audible and without abnormalities noted. Patient has a normal rate and regular rhythm, no periphreal edema noted, capillary refill < 3 seconds. Patient attached to continuous cardiac monitor, automatic/cycling BP cuff, and continuous pulse ox.  Neuro/LOC: Eyes open spontaneously, behavior appropriate to situation, follows commands, facial expression symmetrical, purposeful motor response noted. AAOx4; The patient is awake, alert and aware of environment with an appropriate affect, and speaking appropriately. Denies dizziness and HA.  Respiratory: Breath sounds audible, equal and clear bilaterally. Airway is open and patent, respirations are spontaneous, patient has a normal effort and rate, no accessory muscle use noted.  Skin: Intact, warm and dry. Color is consistent with ethnicity. Normal skin turgor and moist mucous membranes.  Gastro: Bowel sounds audible and active x4 quadrants. No tenderness and no distention are present.  Musculoskeletal: Patient moving all extremities spontaneously, no obvious swelling or deformities noted.  Temp: 98.5 PO    -Patient identifiers verified and correct for this patient.  -Patient resting with bedrails up x2 for safety, bed locked in low position, and call bell within reach.

## 2017-12-18 NOTE — TELEPHONE ENCOUNTER
"surg 12/13    Reason for Disposition   Patient sounds very sick or weak to the triager    Answer Assessment - Initial Assessment Questions  1. SYMPTOM: "What's the main symptom you're concerned about?" (e.g., pain, fever, vomiting)     T102 at 9pm. Family with T103+, ST, body aches. Phlegmy chest cough. SOB when walking around due to surg   2. ONSET: "When did ________  start?"     Today   3. SURGERY: "What surgery was performed?"     Bottom   4. DATE of SURGERY: "When was surgery performed?"       12/13  5. ANESTHESIA: " What type of anesthesia did you have?" (e.g., general, spinal, epidural, local)     N/a   6. PAIN: "Is there any pain?" If so, ask: "How bad is it?"  (Scale 1-10; or mild, moderate, severe)     No flu shot. Rates sitting 2-3   7. FEVER: "Do you have a fever?" If so, ask: "What is your temperature, how was it measured, and when did it start?"     102 or - took 600 mg ibuprofen at 9pm   8. VOMITING: "Is there any vomiting?" If yes, ask: "How many times?"    +N/V, ate dinner - vomited 15 mins ago. Good uop, hydrating. +flatus.   9. BLEEDING: "Is there any bleeding?" If so, ask: "How much?" and "Where?"     Put in clamp - part of stitch at anal opening- red and hurts - no drainage, no spreading redness   10. OTHER SYMPTOMS: "Do you have any other symptoms?" (e.g., drainage from wound, painful urination, constipation)       Mucous drainage from bottom - still have opening - healing from inside out    Protocols used: ST POST-OP SYMPTOMS AND [id number]  chills > 30 mins. Denies colored sputum , blood in nasal discharge, cough started today.   rec ED due to T102, surg 12/13, ST, SOB, nausea, cough.call back with questions.   "

## 2017-12-18 NOTE — ED NOTES
Chief Complaint   Patient presents with    Post-op Problem     Fistula clip put in Wednesday, pt now reports fever of 103 at home, chills, dizziness, and vomiting.      Patient states she had a procedure to fix an anal fistula on Wednesday, when she noticed today she had fever/chils/vomiting. Only vomited once, and last episode was a few hours ago. Patient reports she took 600 mg ibuprofen. +Fever and chills at home. States her  and son both have fever and are sick at home.

## 2017-12-21 ENCOUNTER — TELEPHONE (OUTPATIENT)
Dept: SURGERY | Facility: CLINIC | Age: 37
End: 2017-12-21

## 2017-12-21 NOTE — TELEPHONE ENCOUNTER
Spoke with patient.  She can't drive herself here today.  She would like to come tomorrow.  Appointment scheduled.

## 2017-12-21 NOTE — TELEPHONE ENCOUNTER
Susan is having pain on the opposite side of the clips placed on 12/13.  She would like to see Dr. Lima soon.  Appointment scheduled today for 10:00.

## 2017-12-22 ENCOUNTER — OFFICE VISIT (OUTPATIENT)
Dept: SURGERY | Facility: CLINIC | Age: 37
End: 2017-12-22
Payer: COMMERCIAL

## 2017-12-22 ENCOUNTER — ANESTHESIA (OUTPATIENT)
Dept: SURGERY | Facility: HOSPITAL | Age: 37
End: 2017-12-22
Payer: COMMERCIAL

## 2017-12-22 ENCOUNTER — HOSPITAL ENCOUNTER (OUTPATIENT)
Facility: HOSPITAL | Age: 37
LOS: 1 days | Discharge: HOME | End: 2017-12-23
Attending: COLON & RECTAL SURGERY | Admitting: COLON & RECTAL SURGERY
Payer: COMMERCIAL

## 2017-12-22 ENCOUNTER — ANESTHESIA EVENT (OUTPATIENT)
Dept: SURGERY | Facility: HOSPITAL | Age: 37
End: 2017-12-22
Payer: COMMERCIAL

## 2017-12-22 ENCOUNTER — SURGERY (OUTPATIENT)
Age: 37
End: 2017-12-22

## 2017-12-22 VITALS
BODY MASS INDEX: 29.82 KG/M2 | SYSTOLIC BLOOD PRESSURE: 137 MMHG | HEIGHT: 65 IN | DIASTOLIC BLOOD PRESSURE: 75 MMHG | WEIGHT: 179 LBS | HEART RATE: 80 BPM

## 2017-12-22 DIAGNOSIS — Z09 POSTOP CHECK: ICD-10-CM

## 2017-12-22 DIAGNOSIS — K62.89 ANAL PAIN: Primary | ICD-10-CM

## 2017-12-22 DIAGNOSIS — K61.1 PERIRECTAL ABSCESS: Primary | ICD-10-CM

## 2017-12-22 LAB
B-HCG UR QL: NEGATIVE
CTP QC/QA: YES

## 2017-12-22 PROCEDURE — 25000003 PHARM REV CODE 250: Performed by: NURSE ANESTHETIST, CERTIFIED REGISTERED

## 2017-12-22 PROCEDURE — D9220A PRA ANESTHESIA: Mod: ANES,,, | Performed by: ANESTHESIOLOGY

## 2017-12-22 PROCEDURE — 99999 PR PBB SHADOW E&M-EST. PATIENT-LVL III: CPT | Mod: PBBFAC,,, | Performed by: COLON & RECTAL SURGERY

## 2017-12-22 PROCEDURE — 63600175 PHARM REV CODE 636 W HCPCS: Performed by: ANESTHESIOLOGY

## 2017-12-22 PROCEDURE — 25000242 PHARM REV CODE 250 ALT 637 W/ HCPCS: Performed by: NURSE ANESTHETIST, CERTIFIED REGISTERED

## 2017-12-22 PROCEDURE — 36000707: Performed by: COLON & RECTAL SURGERY

## 2017-12-22 PROCEDURE — S0030 INJECTION, METRONIDAZOLE: HCPCS | Performed by: NURSE ANESTHETIST, CERTIFIED REGISTERED

## 2017-12-22 PROCEDURE — 25000003 PHARM REV CODE 250: Performed by: ANESTHESIOLOGY

## 2017-12-22 PROCEDURE — 63600175 PHARM REV CODE 636 W HCPCS: Performed by: NURSE ANESTHETIST, CERTIFIED REGISTERED

## 2017-12-22 PROCEDURE — 46270 REMOVE ANAL FIST SUBQ: CPT | Mod: 78,,, | Performed by: COLON & RECTAL SURGERY

## 2017-12-22 PROCEDURE — 99024 POSTOP FOLLOW-UP VISIT: CPT | Mod: S$GLB,,, | Performed by: COLON & RECTAL SURGERY

## 2017-12-22 PROCEDURE — 71000039 HC RECOVERY, EACH ADD'L HOUR: Performed by: COLON & RECTAL SURGERY

## 2017-12-22 PROCEDURE — 37000009 HC ANESTHESIA EA ADD 15 MINS: Performed by: COLON & RECTAL SURGERY

## 2017-12-22 PROCEDURE — 63600175 PHARM REV CODE 636 W HCPCS: Performed by: COLON & RECTAL SURGERY

## 2017-12-22 PROCEDURE — 25000003 PHARM REV CODE 250: Performed by: SURGERY

## 2017-12-22 PROCEDURE — D9220A PRA ANESTHESIA: Mod: CRNA,,, | Performed by: NURSE ANESTHETIST, CERTIFIED REGISTERED

## 2017-12-22 PROCEDURE — 81025 URINE PREGNANCY TEST: CPT | Performed by: COLON & RECTAL SURGERY

## 2017-12-22 PROCEDURE — 25000003 PHARM REV CODE 250: Performed by: COLON & RECTAL SURGERY

## 2017-12-22 PROCEDURE — C9290 INJ, BUPIVACAINE LIPOSOME: HCPCS | Performed by: COLON & RECTAL SURGERY

## 2017-12-22 PROCEDURE — 71000033 HC RECOVERY, INTIAL HOUR: Performed by: COLON & RECTAL SURGERY

## 2017-12-22 PROCEDURE — 36000706: Performed by: COLON & RECTAL SURGERY

## 2017-12-22 PROCEDURE — 37000008 HC ANESTHESIA 1ST 15 MINUTES: Performed by: COLON & RECTAL SURGERY

## 2017-12-22 PROCEDURE — S0028 INJECTION, FAMOTIDINE, 20 MG: HCPCS | Performed by: NURSE ANESTHETIST, CERTIFIED REGISTERED

## 2017-12-22 PROCEDURE — 25000003 PHARM REV CODE 250: Performed by: STUDENT IN AN ORGANIZED HEALTH CARE EDUCATION/TRAINING PROGRAM

## 2017-12-22 RX ORDER — SUCCINYLCHOLINE CHLORIDE 20 MG/ML
INJECTION INTRAMUSCULAR; INTRAVENOUS
Status: DISCONTINUED | OUTPATIENT
Start: 2017-12-22 | End: 2017-12-22

## 2017-12-22 RX ORDER — METRONIDAZOLE 500 MG/100ML
INJECTION, SOLUTION INTRAVENOUS
Status: DISCONTINUED | OUTPATIENT
Start: 2017-12-22 | End: 2017-12-22

## 2017-12-22 RX ORDER — OXYCODONE HYDROCHLORIDE 5 MG/1
5 TABLET ORAL EVERY 4 HOURS PRN
Qty: 41 TABLET | Refills: 0 | Status: SHIPPED | OUTPATIENT
Start: 2017-12-22 | End: 2018-01-05

## 2017-12-22 RX ORDER — OXYCODONE HYDROCHLORIDE 5 MG/1
5 TABLET ORAL EVERY 4 HOURS PRN
Status: DISCONTINUED | OUTPATIENT
Start: 2017-12-22 | End: 2017-12-23 | Stop reason: HOSPADM

## 2017-12-22 RX ORDER — HYDROMORPHONE HYDROCHLORIDE 2 MG/ML
0.2 INJECTION, SOLUTION INTRAMUSCULAR; INTRAVENOUS; SUBCUTANEOUS EVERY 5 MIN PRN
Status: DISCONTINUED | OUTPATIENT
Start: 2017-12-22 | End: 2017-12-22 | Stop reason: HOSPADM

## 2017-12-22 RX ORDER — SODIUM CHLORIDE 9 MG/ML
INJECTION, SOLUTION INTRAVENOUS CONTINUOUS
Status: DISCONTINUED | OUTPATIENT
Start: 2017-12-22 | End: 2017-12-22

## 2017-12-22 RX ORDER — OXYCODONE HYDROCHLORIDE 5 MG/1
10 TABLET ORAL EVERY 4 HOURS PRN
Status: DISCONTINUED | OUTPATIENT
Start: 2017-12-22 | End: 2017-12-23 | Stop reason: HOSPADM

## 2017-12-22 RX ORDER — METRONIDAZOLE 500 MG/1
500 TABLET ORAL EVERY 8 HOURS
Qty: 15 TABLET | Refills: 0 | Status: SHIPPED | OUTPATIENT
Start: 2017-12-22 | End: 2017-12-27

## 2017-12-22 RX ORDER — FENTANYL CITRATE 50 UG/ML
INJECTION, SOLUTION INTRAMUSCULAR; INTRAVENOUS
Status: DISCONTINUED | OUTPATIENT
Start: 2017-12-22 | End: 2017-12-22

## 2017-12-22 RX ORDER — PROPOFOL 10 MG/ML
VIAL (ML) INTRAVENOUS
Status: DISCONTINUED | OUTPATIENT
Start: 2017-12-22 | End: 2017-12-22

## 2017-12-22 RX ORDER — SODIUM CHLORIDE 0.9 % (FLUSH) 0.9 %
3 SYRINGE (ML) INJECTION
Status: DISCONTINUED | OUTPATIENT
Start: 2017-12-22 | End: 2017-12-23 | Stop reason: HOSPADM

## 2017-12-22 RX ORDER — MIDAZOLAM HYDROCHLORIDE 1 MG/ML
INJECTION, SOLUTION INTRAMUSCULAR; INTRAVENOUS
Status: DISCONTINUED | OUTPATIENT
Start: 2017-12-22 | End: 2017-12-22

## 2017-12-22 RX ORDER — METOCLOPRAMIDE HYDROCHLORIDE 5 MG/ML
INJECTION INTRAMUSCULAR; INTRAVENOUS
Status: DISCONTINUED | OUTPATIENT
Start: 2017-12-22 | End: 2017-12-22

## 2017-12-22 RX ORDER — LORAZEPAM 0.5 MG/1
0.5 TABLET ORAL EVERY 6 HOURS PRN
Status: DISCONTINUED | OUTPATIENT
Start: 2017-12-22 | End: 2017-12-23 | Stop reason: HOSPADM

## 2017-12-22 RX ORDER — PROMETHAZINE HYDROCHLORIDE 25 MG/ML
INJECTION, SOLUTION INTRAMUSCULAR; INTRAVENOUS
Status: DISPENSED
Start: 2017-12-22 | End: 2017-12-23

## 2017-12-22 RX ORDER — BUPIVACAINE HYDROCHLORIDE 2.5 MG/ML
INJECTION, SOLUTION EPIDURAL; INFILTRATION; INTRACAUDAL
Status: DISCONTINUED | OUTPATIENT
Start: 2017-12-22 | End: 2017-12-22 | Stop reason: HOSPADM

## 2017-12-22 RX ORDER — IBUPROFEN 600 MG/1
600 TABLET ORAL EVERY 6 HOURS PRN
Status: DISCONTINUED | OUTPATIENT
Start: 2017-12-22 | End: 2017-12-23 | Stop reason: HOSPADM

## 2017-12-22 RX ORDER — ROCURONIUM BROMIDE 10 MG/ML
INJECTION, SOLUTION INTRAVENOUS
Status: DISCONTINUED | OUTPATIENT
Start: 2017-12-22 | End: 2017-12-22

## 2017-12-22 RX ORDER — ALBUTEROL SULFATE 90 UG/1
AEROSOL, METERED RESPIRATORY (INHALATION)
Status: DISCONTINUED | OUTPATIENT
Start: 2017-12-22 | End: 2017-12-22

## 2017-12-22 RX ORDER — METRONIDAZOLE 500 MG/100ML
500 INJECTION, SOLUTION INTRAVENOUS
Status: DISCONTINUED | OUTPATIENT
Start: 2017-12-22 | End: 2017-12-23 | Stop reason: HOSPADM

## 2017-12-22 RX ORDER — HYDROMORPHONE HYDROCHLORIDE 2 MG/ML
0.5 INJECTION, SOLUTION INTRAMUSCULAR; INTRAVENOUS; SUBCUTANEOUS
Status: DISCONTINUED | OUTPATIENT
Start: 2017-12-22 | End: 2017-12-23 | Stop reason: HOSPADM

## 2017-12-22 RX ORDER — ONDANSETRON 2 MG/ML
INJECTION INTRAMUSCULAR; INTRAVENOUS
Status: DISCONTINUED | OUTPATIENT
Start: 2017-12-22 | End: 2017-12-22

## 2017-12-22 RX ORDER — LIDOCAINE HCL/PF 100 MG/5ML
SYRINGE (ML) INTRAVENOUS
Status: DISCONTINUED | OUTPATIENT
Start: 2017-12-22 | End: 2017-12-22

## 2017-12-22 RX ORDER — FAMOTIDINE 10 MG/ML
INJECTION INTRAVENOUS
Status: DISCONTINUED | OUTPATIENT
Start: 2017-12-22 | End: 2017-12-22

## 2017-12-22 RX ADMIN — ALBUTEROL SULFATE 4 PUFF: 90 AEROSOL, METERED RESPIRATORY (INHALATION) at 05:12

## 2017-12-22 RX ADMIN — GELATIN ABSORBABLE SPONGE 12-7 MM 2 EACH: 12-7 MISC at 05:12

## 2017-12-22 RX ADMIN — FENTANYL CITRATE 100 MCG: 50 INJECTION, SOLUTION INTRAMUSCULAR; INTRAVENOUS at 05:12

## 2017-12-22 RX ADMIN — IBUPROFEN 800 MG: 800 INJECTION INTRAVENOUS at 03:12

## 2017-12-22 RX ADMIN — SODIUM CHLORIDE: 0.9 INJECTION, SOLUTION INTRAVENOUS at 04:12

## 2017-12-22 RX ADMIN — IBUPROFEN 600 MG: 600 TABLET, FILM COATED ORAL at 11:12

## 2017-12-22 RX ADMIN — SODIUM CHLORIDE, SODIUM GLUCONATE, SODIUM ACETATE, POTASSIUM CHLORIDE, MAGNESIUM CHLORIDE, SODIUM PHOSPHATE, DIBASIC, AND POTASSIUM PHOSPHATE: .53; .5; .37; .037; .03; .012; .00082 INJECTION, SOLUTION INTRAVENOUS at 05:12

## 2017-12-22 RX ADMIN — BUPIVACAINE 20 ML: 13.3 INJECTION, SUSPENSION, LIPOSOMAL INFILTRATION at 05:12

## 2017-12-22 RX ADMIN — ROCURONIUM BROMIDE 5 MG: 10 INJECTION, SOLUTION INTRAVENOUS at 05:12

## 2017-12-22 RX ADMIN — LIDOCAINE HYDROCHLORIDE 100 MG: 20 INJECTION, SOLUTION INTRAVENOUS at 05:12

## 2017-12-22 RX ADMIN — ONDANSETRON 8 MG: 2 INJECTION INTRAMUSCULAR; INTRAVENOUS at 04:12

## 2017-12-22 RX ADMIN — METRONIDAZOLE 500 MG: 500 SOLUTION INTRAVENOUS at 05:12

## 2017-12-22 RX ADMIN — PROMETHAZINE HYDROCHLORIDE 6.25 MG: 25 INJECTION INTRAMUSCULAR; INTRAVENOUS at 06:12

## 2017-12-22 RX ADMIN — PROPOFOL 200 MG: 10 INJECTION, EMULSION INTRAVENOUS at 05:12

## 2017-12-22 RX ADMIN — SUCCINYLCHOLINE CHLORIDE 120 MG: 20 INJECTION, SOLUTION INTRAMUSCULAR; INTRAVENOUS at 05:12

## 2017-12-22 RX ADMIN — ALBUTEROL SULFATE 2 PUFF: 90 AEROSOL, METERED RESPIRATORY (INHALATION) at 04:12

## 2017-12-22 RX ADMIN — PROMETHAZINE HYDROCHLORIDE 6.25 MG: 25 INJECTION INTRAMUSCULAR; INTRAVENOUS at 07:12

## 2017-12-22 RX ADMIN — MIDAZOLAM HYDROCHLORIDE 2 MG: 1 INJECTION, SOLUTION INTRAMUSCULAR; INTRAVENOUS at 04:12

## 2017-12-22 RX ADMIN — BUPIVACAINE HYDROCHLORIDE 30 ML: 2.5 INJECTION, SOLUTION EPIDURAL; INFILTRATION; INTRACAUDAL; PERINEURAL at 05:12

## 2017-12-22 RX ADMIN — METOCLOPRAMIDE 10 MG: 5 INJECTION, SOLUTION INTRAMUSCULAR; INTRAVENOUS at 04:12

## 2017-12-22 RX ADMIN — FAMOTIDINE 20 MG: 10 INJECTION, SOLUTION INTRAVENOUS at 04:12

## 2017-12-22 NOTE — PROGRESS NOTES
Pt belongings (clothing and cellphone) placed in [facility] lockers, no family present to take belongings.

## 2017-12-22 NOTE — OP NOTE
[facility]  Surgery Department  Operative Note    SUMMARY     Date of Procedure: 12/22/2017     Procedure: Procedure(s) (LRB):  EXAM UNDER ANESTHESIA, possible I&D, seton, removal of ovesco clip (N/A)  FISTULOTOMY (N/A)     Surgeon(s) and Role:     * Jonathan Schoen, MD - Resident - Assisting     * Kirk Lima MD - Primary        Pre-Operative Diagnosis: Anal pain [K62.89]    Post-Operative Diagnosis: Post-Op Diagnosis Codes:     * Anal pain [K62.89]    Anesthesia: General    Technical Procedures Used: none    Procedure In-detail:  After informed consent was obtained/assured and H&P was updated, the patient was brought back to the OR, general endotracheal anesthesia was induced and she was moved to the operating room table in the prone position.  She was padded and secured appropriately. Her perianal area was prepped with betadine and draped appropriately.  The Frederick retractor was inserted and circumferential assessment was performed.  The ovesco clip was noted to be quite loose and was easily removed with little effort and the Debakey forceps.  A shallow fistulous tract that was not traversing the internal sphincter was probed with the fistula probe and a fistulotomy was performed using the Bovie.  Hemostasis was good, gelfoam was packed into the anal canal, fluff gauze was placed and drapes were taken down. Mesh briefs were placed.  The patient was moved to the stretcher supine and extubated and brought to recovery in good condition. Procedure tolerated well.       Significant Surgical Tasks Conducted by the Assistant(s), if Applicable: assistance    Complications: No    Estimated Blood Loss (EBL): 5mL           Implants: * No implants in log *    Specimens:   Specimen (12h ago through future)    None                  Condition: Good    Disposition: PACU - hemodynamically stable.    Attestation: I was present and scrubbed for the entire procedure.

## 2017-12-22 NOTE — ANESTHESIA PREPROCEDURE EVALUATION
12/22/2017  [facility]  Anesthesia Pre-Operative Evaluation         Patient Name: Susan Anaya  YOB: 1980  MRN: [id number]    SUBJECTIVE:     Pre-operative evaluation for Procedure(s) (LRB):  EXAM UNDER ANESTHESIA, possible I&D, seton, removal of ovesco clip (N/A)     12/22/2017    Susan Anaya is a 37 y.o. female w/ a significant PMHx of vulvar abscess and perianal abscess s/p I and de who presents for the above procedure.    LDA:   Left antecubital 20g PIV    Prev airway:   Present Prior to Hospital Arrival?: No; Placement Date: 12/13/17; Placement Time: 1438; Method of Intubation: Direct laryngoscopy; Inserted by: CRNA; Airway Device: Endotracheal Tube; Mask Ventilation: Easy; Blade: Lora #2; Airway Device Size: 7.0; Style: Cuffed; Cuff Inflation: Minimal occlusive pressure; Inflation Amount: 6; Placement Verified By: Auscultation, Capnometry, ETT Condensation; Grade: Grade I; Complicating Factors: None; Intubation Findings: Positive EtCO2, Bilateral breath sounds, Atraumatic/Condition of teeth unchanged;  Depth of Insertion: 22; Securment: Lips; Complications: None; Breath Sounds: Equal Bilateral; Insertion Attempts: 1; Removal Date: 12/13/17;  Removal Time: 1537    Drips: None documented.      Patient Active Problem List   Diagnosis    Gluten intolerance    Vulvar abscess    Status post incision and drainage    Perianal abscess    Fistula-in-ano       Review of patient's allergies indicates:   Allergen Reactions    Tree nuts Anaphylaxis    Steroid [corticosteroids (glucocorticoids)] Other (See Comments)     Muscle weakness dizziness       Current Inpatient Medications:      No current facility-administered medications on file prior to encounter.      Current Outpatient Prescriptions on File Prior to Encounter   Medication Sig Dispense Refill  "   acetaminophen (TYLENOL) 500 MG tablet Take 2 tablets (1,000 mg total) by mouth every 6 (six) hours.  0    ibuprofen (ADVIL,MOTRIN) 800 MG tablet Take 1 tablet (800 mg total) by mouth 3 (three) times daily. 60 tablet 1    LORazepam (ATIVAN) 0.5 MG tablet Take 0.5 mg by mouth every 6 (six) hours as needed for Anxiety.      MULTIVIT WITH CALCIUM,IRON,MIN (WOMEN'S MULTIPLE VITAMINS ORAL) Take by mouth.      oxyCODONE (ROXICODONE) 5 MG immediate release tablet Take 1 tablet (5 mg total) by mouth every 4 (four) hours as needed for Pain. 40 tablet 0       Past Surgical History:   Procedure Laterality Date     SECTION      X 2     COSMETIC SURGERY      liposuction onpelvic area    FISTULA REPAIR  2017    Patient reports "ovesco clip" and states it was a trial.    PELVIC LAPAROSCOPY         Social History     Social History    Marital status:      Spouse name: N/A    Number of children: N/A    Years of education: N/A     Occupational History    Not on file.     Social History Main Topics    Smoking status: Never Smoker    Smokeless tobacco: Never Used    Alcohol use No    Drug use: No    Sexual activity: Yes     Partners: Male     Birth control/ protection: Condom      Comment:       Other Topics Concern    Not on file     Social History Narrative    No narrative on file       OBJECTIVE:     Vital Signs Range (Last 24H):  Temp:  [36.5 °C (97.7 °F)]   Pulse:  [74-80]   Resp:  [20]   BP: (120-137)/(75-83)   SpO2:  [100 %]       CBC:   No results for input(s): WBC, RBC, HGB, HCT, PLT, MCV, MCH, MCHC in the last 72 hours.    CMP: No results for input(s): NA, K, CL, CO2, BUN, CREATININE, GLU, MG, PHOS, CALCIUM, ALBUMIN, PROT, ALKPHOS, ALT, AST, BILITOT in the last 72 hours.    INR:  No results for input(s): PT, INR, PROTIME, APTT in the last 72 hours.    Diagnostic Studies: No relevant studies.    EKG: No recent studies available.    ASSESSMENT/PLAN:         Anesthesia " Evaluation    I have reviewed the Patient Summary Reports.     I have reviewed the Medications.     Review of Systems  Anesthesia Hx:  Hx of Anesthetic complications  History of prior surgery of interest to airway management or planning: Previous anesthesia: General Denies Family Hx of Anesthesia complications.  Personal Hx of Anesthesia complications, Post-Operative Nausea/Vomiting   Social:  Non-Smoker    Hematology/Oncology:  Hematology Normal   Oncology Normal     EENT/Dental:EENT/Dental Normal   Cardiovascular:  Cardiovascular Normal     Pulmonary:   Asthma Recent URI    Renal/:  Renal/ Normal     Hepatic/GI:  Hepatic/GI Normal Perianal/perivulvar abscess   Musculoskeletal:  Musculoskeletal Normal    Neurological:  Neurology Normal    Endocrine:  Endocrine Normal    Dermatological:  Skin Normal    Psych:  Psychiatric Normal              Anesthesia Plan  Type of Anesthesia, risks & benefits discussed:  Anesthesia Type:  general, MAC  Patient's Preference:   Intra-op Monitoring Plan: standard ASA monitors  Intra-op Monitoring Plan Comments:   Post Op Pain Control Plan: multimodal analgesia  Post Op Pain Control Plan Comments:   Induction:   IV  Beta Blocker:  Patient is not currently on a Beta-Blocker (No further documentation required).       Informed Consent: Patient understands risks and agrees with Anesthesia plan.  Questions answered. Anesthesia consent signed with patient.  ASA Score: 2     Day of Surgery Review of History & Physical:            Ready For Surgery From Anesthesia Perspective.

## 2017-12-22 NOTE — ANESTHESIA PREPROCEDURE EVALUATION
"                                                                                                             2017  Susan Anaya is a 37 y.o., female.  Pre-operative evaluation for EXAM UNDER ANESTHESIA, possible I&D, seton, removal of ovesco clip (N/A)    Chief Complaint:    PMH:  Patient Active Problem List   Diagnosis    Gluten intolerance    Vulvar abscess    Status post incision and drainage    Perianal abscess    Fistula-in-ano         Past Surgical History:   Procedure Laterality Date     SECTION      X 2     COSMETIC SURGERY      liposuction onpelvic area    FISTULA REPAIR  2017    Patient reports "ovesco clip" and states it was a trial.    PELVIC LAPAROSCOPY           Vital Signs Range (Last 24H):  Temp:  [36.5 °C (97.7 °F)]   Pulse:  [74-80]   Resp:  [20]   BP: (120-137)/(75-83)   SpO2:  [100 %]       CBC:     Recent Labs  Lab 17  0208   WBC 7.49   RBC 4.72   HGB 12.9   HCT 38.2      MCV 81*   MCH 27.3   MCHC 33.8       CMP:   Recent Labs  Lab 17  0208      K 4.1      CO2 20*   BUN 19   CREATININE 0.9      CALCIUM 9.5   ALBUMIN 3.9   PROT 7.6   ALKPHOS 74   ALT 15   AST 17   BILITOT 0.6       INR:  No results for input(s): PT, INR, PROTIME, APTT in the last 720 hours.      Diagnostic Studies:      EKD Echo:    Pre-op Assessment         Review of Systems            "

## 2017-12-22 NOTE — BRIEF OP NOTE
[facility]  Brief Operative Note    SUMMARY     Surgery Date: 12/22/2017     Surgeon(s) and Role:     * Kirk Lima MD - Primary     * Jonathan Schoen, MD - Resident - Assisting        Pre-op Diagnosis:  Anal pain [K62.89]    Post-op Diagnosis:  Post-Op Diagnosis Codes:     * Anal pain [K62.89]    Procedure(s) (LRB):  EXAM UNDER ANESTHESIA, possible I&D, seton, removal of ovesco clip (N/A)  FISTULOTOMY (N/A)    Anesthesia: General    Description of Procedure: ovesco clip removed and anterior fistulotomy performed through superficial tract    Description of the findings of the procedure: as above    Estimated Blood Loss: 5mL         Specimens:   Specimen (12h ago through future)    None

## 2017-12-22 NOTE — PROGRESS NOTES
"Subjective:       Patient ID: Susan Anaya is a 37 y.o. female.    Chief Complaint: No chief complaint on file.    38 yo F with history of anal fistula s/p previous seton placement, I&Ds and most recently ovesco clip placement 2017 who presents with anal pain. For the first couple of days after surgery, she had as expected pain. Following that, the pain worsened and was especially intense with bowel movements. It lasted for hours afterward. At the same time, she also developed a cold/flu symptoms (her  and son were ill) and a fever. She went to the ER on  which did not show any abscess.     She presents today for continued and worsening pain. She says it is tender in the anterior inner portion of the anus on the left side. She has had some mucous drainage. No blood in the stool.        Review of patient's allergies indicates:   Allergen Reactions    Tree nuts Anaphylaxis    Steroid [corticosteroids (glucocorticoids)] Other (See Comments)     Muscle weakness dizziness       Past Medical History:   Diagnosis Date    Abnormal Pap smear     Abnormal Pap smear of cervix     Abnormal Pap smear of vagina         Allergy     Asthma     h/o    Celiac disease     gluten sensitivity    General anesthetics causing adverse effect in therapeutic use     PONV (postoperative nausea and vomiting)        Past Surgical History:   Procedure Laterality Date     SECTION      X 2     COSMETIC SURGERY      liposuction onpelvic area    FISTULA REPAIR  2017    Patient reports "ovesco clip" and states it was a trial.    PELVIC LAPAROSCOPY         Current Outpatient Prescriptions   Medication Sig Dispense Refill    acetaminophen (TYLENOL) 500 MG tablet Take 2 tablets (1,000 mg total) by mouth every 6 (six) hours.  0    ibuprofen (ADVIL,MOTRIN) 800 MG tablet Take 1 tablet (800 mg total) by mouth 3 (three) times daily. 60 tablet 1    LORazepam (ATIVAN) 0.5 MG tablet Take 0.5 mg by " mouth every 6 (six) hours as needed for Anxiety.      MULTIVIT WITH CALCIUM,IRON,MIN (WOMEN'S MULTIPLE VITAMINS ORAL) Take by mouth.      oxyCODONE (ROXICODONE) 5 MG immediate release tablet Take 1 tablet (5 mg total) by mouth every 4 (four) hours as needed for Pain. 40 tablet 0     No current facility-administered medications for this visit.        Family History   Problem Relation Age of Onset    Hypertension Father     Breast cancer Paternal Aunt     Cancer Maternal Grandfather 80     lung ca    Diabetes Maternal Grandfather 60    Hypertension Paternal Grandmother     Hypertension Paternal Grandfather     Aneurysm Paternal Grandfather     Asthma Son     Hypotension Maternal Grandmother     Breast cancer Maternal Grandmother     Colon cancer Neg Hx     Ovarian cancer Neg Hx     Anesthesia problems Neg Hx        Social History     Social History    Marital status:      Spouse name: N/A    Number of children: N/A    Years of education: N/A     Social History Main Topics    Smoking status: Never Smoker    Smokeless tobacco: Never Used    Alcohol use No    Drug use: No    Sexual activity: Yes     Partners: Male     Birth control/ protection: Condom      Comment:       Other Topics Concern    Not on file     Social History Narrative    No narrative on file       Review of Systems   Constitutional: Negative for chills, diaphoresis, fatigue and fever.   HENT: Negative for congestion.    Eyes: Negative for visual disturbance.   Respiratory: Negative for cough and shortness of breath.    Cardiovascular: Negative for chest pain.   Gastrointestinal: Negative for abdominal pain, blood in stool, constipation, diarrhea, nausea, rectal pain and vomiting.        Anal pain, mucous drainage   Genitourinary: Negative for dysuria.   Musculoskeletal: Negative for arthralgias.   Skin: Negative for color change.   Neurological: Negative for dizziness and syncope.   Hematological: Negative for  adenopathy.       Objective:      Physical Exam   Constitutional: She is oriented to person, place, and time. She appears well-developed and well-nourished.   HENT:   Head: Normocephalic and atraumatic.   Eyes: EOM are normal.   Cardiovascular: Normal rate, regular rhythm and normal heart sounds.    Pulmonary/Chest: Effort normal and breath sounds normal.   Abdominal: Soft. She exhibits no distension and no mass. There is no tenderness. There is no rebound and no guarding. No hernia.   Musculoskeletal: Normal range of motion.   Neurological: She is alert and oriented to person, place, and time.   Skin: Skin is warm. Capillary refill takes less than 2 seconds.   Psychiatric: She has a normal mood and affect. Her behavior is normal.   Nursing note and vitals reviewed.      Anorectal exam:  After 4% lidocaine cream, unable to get a good exam  Anterior mucosa of anus extremely tender to palpation  Drainage of purulent fluid from external opening on the right    Lab Results   Component Value Date    WBC 7.49 12/18/2017    HGB 12.9 12/18/2017    HCT 38.2 12/18/2017    MCV 81 (L) 12/18/2017     12/18/2017     BMP  Lab Results   Component Value Date     12/18/2017    K 4.1 12/18/2017     12/18/2017    CO2 20 (L) 12/18/2017    BUN 19 12/18/2017    CREATININE 0.9 12/18/2017    CALCIUM 9.5 12/18/2017    ANIONGAP 10 12/18/2017    ESTGFRAFRICA >60.0 12/18/2017    EGFRNONAA >60.0 12/18/2017     CMP  Sodium   Date Value Ref Range Status   12/18/2017 140 136 - 145 mmol/L Final     Potassium   Date Value Ref Range Status   12/18/2017 4.1 3.5 - 5.1 mmol/L Final     Chloride   Date Value Ref Range Status   12/18/2017 110 95 - 110 mmol/L Final     CO2   Date Value Ref Range Status   12/18/2017 20 (L) 23 - 29 mmol/L Final     Glucose   Date Value Ref Range Status   12/18/2017 106 70 - 110 mg/dL Final     BUN, Bld   Date Value Ref Range Status   12/18/2017 19 6 - 20 mg/dL Final     Creatinine   Date Value Ref Range  Status   12/18/2017 0.9 0.5 - 1.4 mg/dL Final     Calcium   Date Value Ref Range Status   12/18/2017 9.5 8.7 - 10.5 mg/dL Final     Total Protein   Date Value Ref Range Status   12/18/2017 7.6 6.0 - 8.4 g/dL Final     Albumin   Date Value Ref Range Status   12/18/2017 3.9 3.5 - 5.2 g/dL Final     Total Bilirubin   Date Value Ref Range Status   12/18/2017 0.6 0.1 - 1.0 mg/dL Final     Comment:     For infants and newborns, interpretation of results should be based  on gestational age, weight and in agreement with clinical  observations.  Premature Infant recommended reference ranges:  Up to 24 hours.............<8.0 mg/dL  Up to 48 hours............<12.0 mg/dL  3-5 days..................<15.0 mg/dL  6-29 days.................<15.0 mg/dL       Alkaline Phosphatase   Date Value Ref Range Status   12/18/2017 74 55 - 135 U/L Final     AST   Date Value Ref Range Status   12/18/2017 17 10 - 40 U/L Final     ALT   Date Value Ref Range Status   12/18/2017 15 10 - 44 U/L Final     Anion Gap   Date Value Ref Range Status   12/18/2017 10 8 - 16 mmol/L Final     eGFR if    Date Value Ref Range Status   12/18/2017 >60.0 >60 mL/min/1.73 m^2 Final     eGFR if non    Date Value Ref Range Status   12/18/2017 >60.0 >60 mL/min/1.73 m^2 Final     Comment:     Calculation used to obtain the estimated glomerular filtration  rate (eGFR) is the CKD-EPI equation.        No results found for: CEA    Assessment:       1. Anal pain        Plan:       - EUA with possible seton, possible I&D, possible removal of ovesco clip   - Consent signed    Melanie Cruz MD  Colon and Rectal Surgery Fellow

## 2017-12-23 VITALS
DIASTOLIC BLOOD PRESSURE: 67 MMHG | WEIGHT: 179 LBS | RESPIRATION RATE: 18 BRPM | BODY MASS INDEX: 29.82 KG/M2 | SYSTOLIC BLOOD PRESSURE: 113 MMHG | HEIGHT: 65 IN | HEART RATE: 63 BPM | OXYGEN SATURATION: 98 % | TEMPERATURE: 97 F

## 2017-12-23 PROCEDURE — 25000003 PHARM REV CODE 250: Performed by: SURGERY

## 2017-12-23 PROCEDURE — 25000003 PHARM REV CODE 250: Performed by: STUDENT IN AN ORGANIZED HEALTH CARE EDUCATION/TRAINING PROGRAM

## 2017-12-23 PROCEDURE — S0030 INJECTION, METRONIDAZOLE: HCPCS | Performed by: SURGERY

## 2017-12-23 RX ORDER — CIPROFLOXACIN 500 MG/1
500 TABLET ORAL EVERY 12 HOURS
Qty: 14 TABLET | Refills: 0 | Status: SHIPPED | OUTPATIENT
Start: 2017-12-23 | End: 2017-12-30

## 2017-12-23 RX ADMIN — METRONIDAZOLE 500 MG: 500 SOLUTION INTRAVENOUS at 08:12

## 2017-12-23 RX ADMIN — OXYCODONE HYDROCHLORIDE 10 MG: 5 TABLET ORAL at 08:12

## 2017-12-23 RX ADMIN — IBUPROFEN 600 MG: 600 TABLET, FILM COATED ORAL at 10:12

## 2017-12-23 RX ADMIN — METRONIDAZOLE 500 MG: 500 SOLUTION INTRAVENOUS at 01:12

## 2017-12-23 NOTE — DISCHARGE SUMMARY
[facility]  General Surgery  Discharge Summary      Patient Name: Susan Anaya  MRN: [id number]  Admission Date: 12/22/2017  Hospital Length of Stay: 1 days  Discharge Date and Time:  12/23/2017 11:42 AM  Attending Physician: Kirk Lima MD   Discharging Provider: Mac Ramirez MD  Primary Care Provider: Yandy Navarrete MD     Procedure(s) (LRB):  EXAM UNDER ANESTHESIA, possible I&D, seton, removal of ovesco clip (N/A)  FISTULOTOMY (N/A)     Hospital Course: Patient admitted following an exam under anesthesia with ovesco clip removal and anterior fistulotomy. She totlerated the procedure without complication. She was deemed safe for discharge home on postoperative day 1. Please see full medical record for further details.     Consults: N/A    Significant Diagnostic Studies: Please see full medical record.       Pending Diagnostic Studies:     None        Final Active Diagnoses:    Diagnosis Date Noted POA    PRINCIPAL PROBLEM:  Perirectal abscess [K61.1] 12/22/2017 Yes      Problems Resolved During this Admission:    Diagnosis Date Noted Date Resolved POA      Discharged Condition: good    Disposition: Home or Self Care    Follow Up:  Follow-up Information     Kirk Lima MD In 2 weeks.    Specialty:  Colon and Rectal Surgery  Contact information:  [address] [place] [address] [id number] [phone]                 Patient Instructions:     Activity as tolerated     Notify your health care provider if you experience any of the following:  temperature >100.4     Notify your health care provider if you experience any of the following:  persistent nausea and vomiting or diarrhea     Notify your health care provider if you experience any of the following:  severe uncontrolled pain     Notify your health care provider if you experience any of the following:  redness, tenderness, or signs of infection (pain, swelling, redness, odor or green/yellow discharge around  incision site)     Notify your health care provider if you experience any of the following:  difficulty breathing or increased cough     Notify your health care provider if you experience any of the following:  severe persistent headache     Notify your health care provider if you experience any of the following:  worsening rash     Notify your health care provider if you experience any of the following:  persistent dizziness, light-headedness, or visual disturbances     Notify your health care provider if you experience any of the following:  increased confusion or weakness     Notify your health care provider if you experience any of the following:     No dressing needed       Medications:  Reconciled Home Medications:   Current Discharge Medication List      START taking these medications    Details   ciprofloxacin HCl (CIPRO) 500 MG tablet Take 1 tablet (500 mg total) by mouth every 12 (twelve) hours.  Qty: 14 tablet, Refills: 0      metroNIDAZOLE (FLAGYL) 500 MG tablet Take 1 tablet (500 mg total) by mouth every 8 (eight) hours.  Qty: 15 tablet, Refills: 0         CONTINUE these medications which have CHANGED    Details   oxyCODONE (ROXICODONE) 5 MG immediate release tablet Take 1 tablet (5 mg total) by mouth every 4 (four) hours as needed.  Qty: 41 tablet, Refills: 0         CONTINUE these medications which have NOT CHANGED    Details   acetaminophen (TYLENOL) 500 MG tablet Take 2 tablets (1,000 mg total) by mouth every 6 (six) hours.  Refills: 0      ibuprofen (ADVIL,MOTRIN) 800 MG tablet Take 1 tablet (800 mg total) by mouth 3 (three) times daily.  Qty: 60 tablet, Refills: 1      LORazepam (ATIVAN) 0.5 MG tablet Take 0.5 mg by mouth every 6 (six) hours as needed for Anxiety.      MULTIVIT WITH CALCIUM,IRON,MIN (WOMEN'S MULTIPLE VITAMINS ORAL) Take by mouth.             Mac Ramirez MD  General Surgery  [facility]

## 2017-12-23 NOTE — PROGRESS NOTES
Attempted to call report. 5th floor unable to take report at this time. Pt. Resting quietly w/ eyes closed. VSS.

## 2017-12-23 NOTE — TRANSFER OF CARE
"Anesthesia Transfer of Care Note    Patient: Susan Anaya    Procedure(s) Performed: Procedure(s) (LRB):  EXAM UNDER ANESTHESIA, possible I&D, seton, removal of ovesco clip (N/A)  FISTULOTOMY (N/A)    Patient location: PACU    Anesthesia Type: general    Transport from OR: Transported from OR on room air with adequate spontaneous ventilation    Post pain: adequate analgesia    Post assessment: no apparent anesthetic complications    Post vital signs: stable    Level of consciousness: awake, alert and oriented    Nausea/Vomiting: no nausea/vomiting    Complications: none    Transfer of care protocol was followed      Last vitals:   Visit Vitals  BP 99/65   Pulse 95   Temp 36.5 °C (97.7 °F) (Oral)   Resp (!) 24   Ht 5' 5" (1.651 m)   Wt 81.2 kg (179 lb)   LMP 12/14/2017   SpO2 99%   Breastfeeding? No   BMI 29.79 kg/m²     "

## 2017-12-26 NOTE — ANESTHESIA POSTPROCEDURE EVALUATION
"Anesthesia Post Evaluation    Patient: Susan Anaya    Procedure(s) Performed: Procedure(s) (LRB):  EXAM UNDER ANESTHESIA, possible I&D, seton, removal of ovesco clip (N/A)  FISTULOTOMY (N/A)    Final Anesthesia Type: general  Patient location during evaluation: PACU  Patient participation: Yes- Able to Participate  Level of consciousness: awake and alert and oriented  Post-procedure vital signs: reviewed and stable  Pain management: adequate  Airway patency: patent  PONV status at discharge: No PONV  Anesthetic complications: no      Cardiovascular status: hemodynamically stable  Respiratory status: unassisted, spontaneous ventilation and room air  Hydration status: euvolemic  Follow-up not needed.        Visit Vitals  /67 (BP Location: Right arm, Patient Position: Lying)   Pulse 63   Temp 35.9 °C (96.7 °F) (Oral)   Resp 18   Ht 5' 5" (1.651 m)   Wt 81.2 kg (179 lb)   LMP 12/14/2017   SpO2 98%   Breastfeeding? No   BMI 29.79 kg/m²       Pain/Rashida Score: No Data Recorded      "

## 2018-01-05 ENCOUNTER — OFFICE VISIT (OUTPATIENT)
Dept: SURGERY | Facility: CLINIC | Age: 38
End: 2018-01-05
Payer: COMMERCIAL

## 2018-01-05 VITALS
HEART RATE: 80 BPM | SYSTOLIC BLOOD PRESSURE: 140 MMHG | BODY MASS INDEX: 30.3 KG/M2 | WEIGHT: 182.13 LBS | DIASTOLIC BLOOD PRESSURE: 80 MMHG

## 2018-01-05 DIAGNOSIS — Z09 POSTOP CHECK: Primary | ICD-10-CM

## 2018-01-05 PROCEDURE — 99999 PR PBB SHADOW E&M-EST. PATIENT-LVL II: CPT | Mod: PBBFAC,,, | Performed by: COLON & RECTAL SURGERY

## 2018-01-05 PROCEDURE — 99024 POSTOP FOLLOW-UP VISIT: CPT | Mod: S$GLB,,, | Performed by: COLON & RECTAL SURGERY

## 2018-01-05 NOTE — PROGRESS NOTES
2 weeks s/p fistulotomy after OVESCO clamp placement and subsequent increased pain.      Now pain free.  Some incontinence to gas.  Fecal urgency - stools are loose.     Exam: granulating in well. No infection.    A/p Start fiber supplement.   F/u 1 month.

## 2018-01-26 ENCOUNTER — TELEPHONE (OUTPATIENT)
Dept: GASTROENTEROLOGY | Facility: CLINIC | Age: 38
End: 2018-01-26

## 2018-01-26 NOTE — TELEPHONE ENCOUNTER
----- Message from Ivon Early sent at 1/26/2018  3:33 PM CST -----  Contact: Tamia with Dr.Margert Begum's Office:[phone]  Pat with 's office called and states she would like to get the pt in to see  to evaluate for crohn's Tamia would like to speak with the nurse.

## 2018-02-05 ENCOUNTER — OFFICE VISIT (OUTPATIENT)
Dept: SURGERY | Facility: CLINIC | Age: 38
End: 2018-02-05
Payer: COMMERCIAL

## 2018-02-05 VITALS
HEIGHT: 65 IN | BODY MASS INDEX: 30.71 KG/M2 | DIASTOLIC BLOOD PRESSURE: 78 MMHG | HEART RATE: 70 BPM | WEIGHT: 184.31 LBS | SYSTOLIC BLOOD PRESSURE: 133 MMHG

## 2018-02-05 DIAGNOSIS — K60.3 FISTULA-IN-ANO: Primary | ICD-10-CM

## 2018-02-05 PROCEDURE — 99024 POSTOP FOLLOW-UP VISIT: CPT | Mod: S$GLB,,, | Performed by: COLON & RECTAL SURGERY

## 2018-02-05 PROCEDURE — 99999 PR PBB SHADOW E&M-EST. PATIENT-LVL II: CPT | Mod: PBBFAC,,, | Performed by: COLON & RECTAL SURGERY

## 2018-02-20 ENCOUNTER — LAB VISIT (OUTPATIENT)
Dept: LAB | Facility: HOSPITAL | Age: 38
End: 2018-02-20
Attending: INTERNAL MEDICINE
Payer: COMMERCIAL

## 2018-02-20 ENCOUNTER — OFFICE VISIT (OUTPATIENT)
Dept: GASTROENTEROLOGY | Facility: CLINIC | Age: 38
End: 2018-02-20
Payer: COMMERCIAL

## 2018-02-20 VITALS — SYSTOLIC BLOOD PRESSURE: 132 MMHG | HEART RATE: 64 BPM | DIASTOLIC BLOOD PRESSURE: 87 MMHG

## 2018-02-20 DIAGNOSIS — R19.7 DIARRHEA, UNSPECIFIED TYPE: ICD-10-CM

## 2018-02-20 DIAGNOSIS — R14.0 BLOATING SYMPTOM: ICD-10-CM

## 2018-02-20 DIAGNOSIS — R19.5 LOOSE STOOLS: ICD-10-CM

## 2018-02-20 DIAGNOSIS — R53.83 FATIGUE, UNSPECIFIED TYPE: ICD-10-CM

## 2018-02-20 DIAGNOSIS — R53.83 FATIGUE, UNSPECIFIED TYPE: Primary | ICD-10-CM

## 2018-02-20 LAB
25(OH)D3+25(OH)D2 SERPL-MCNC: 40 NG/ML
ALBUMIN SERPL BCP-MCNC: 4.1 G/DL
ALP SERPL-CCNC: 66 U/L
ALT SERPL W/O P-5'-P-CCNC: 20 U/L
ANION GAP SERPL CALC-SCNC: 8 MMOL/L
AST SERPL-CCNC: 25 U/L
BASOPHILS # BLD AUTO: 0.04 K/UL
BASOPHILS NFR BLD: 0.5 %
BILIRUB DIRECT SERPL-MCNC: 0.6 MG/DL
BILIRUB SERPL-MCNC: 1.6 MG/DL
BUN SERPL-MCNC: 15 MG/DL
CALCIUM SERPL-MCNC: 9.7 MG/DL
CHLORIDE SERPL-SCNC: 111 MMOL/L
CO2 SERPL-SCNC: 23 MMOL/L
CREAT SERPL-MCNC: 1 MG/DL
DIFFERENTIAL METHOD: ABNORMAL
EOSINOPHIL # BLD AUTO: 0.3 K/UL
EOSINOPHIL NFR BLD: 3.2 %
ERYTHROCYTE [DISTWIDTH] IN BLOOD BY AUTOMATED COUNT: 14.6 %
EST. GFR  (AFRICAN AMERICAN): >60 ML/MIN/1.73 M^2
EST. GFR  (NON AFRICAN AMERICAN): >60 ML/MIN/1.73 M^2
FERRITIN SERPL-MCNC: 11 NG/ML
GLUCOSE SERPL-MCNC: 93 MG/DL
HCT VFR BLD AUTO: 41 %
HGB BLD-MCNC: 13.9 G/DL
IGA SERPL-MCNC: 152 MG/DL
IMM GRANULOCYTES # BLD AUTO: 0.03 K/UL
IMM GRANULOCYTES NFR BLD AUTO: 0.4 %
IRON SERPL-MCNC: 143 UG/DL
LYMPHOCYTES # BLD AUTO: 2.5 K/UL
LYMPHOCYTES NFR BLD: 30.2 %
MAGNESIUM SERPL-MCNC: 2.2 MG/DL
MCH RBC QN AUTO: 28.3 PG
MCHC RBC AUTO-ENTMCNC: 33.9 G/DL
MCV RBC AUTO: 84 FL
MONOCYTES # BLD AUTO: 0.4 K/UL
MONOCYTES NFR BLD: 4.3 %
NEUTROPHILS # BLD AUTO: 5.1 K/UL
NEUTROPHILS NFR BLD: 61.4 %
NRBC BLD-RTO: 0 /100 WBC
PLATELET # BLD AUTO: 239 K/UL
PMV BLD AUTO: 9.6 FL
POTASSIUM SERPL-SCNC: 4.8 MMOL/L
PROT SERPL-MCNC: 7.9 G/DL
RBC # BLD AUTO: 4.91 M/UL
SATURATED IRON: 26 %
SODIUM SERPL-SCNC: 142 MMOL/L
TOTAL IRON BINDING CAPACITY: 554 UG/DL
TRANSFERRIN SERPL-MCNC: 374 MG/DL
TSH SERPL DL<=0.005 MIU/L-ACNC: 0.74 UIU/ML
VIT B12 SERPL-MCNC: 432 PG/ML
WBC # BLD AUTO: 8.32 K/UL

## 2018-02-20 PROCEDURE — 80076 HEPATIC FUNCTION PANEL: CPT

## 2018-02-20 PROCEDURE — 99999 PR PBB SHADOW E&M-EST. PATIENT-LVL III: CPT | Mod: PBBFAC,,, | Performed by: INTERNAL MEDICINE

## 2018-02-20 PROCEDURE — 82607 VITAMIN B-12: CPT

## 2018-02-20 PROCEDURE — 99204 OFFICE O/P NEW MOD 45 MIN: CPT | Mod: S$GLB,,, | Performed by: INTERNAL MEDICINE

## 2018-02-20 PROCEDURE — 83540 ASSAY OF IRON: CPT

## 2018-02-20 PROCEDURE — 80048 BASIC METABOLIC PNL TOTAL CA: CPT

## 2018-02-20 PROCEDURE — 3008F BODY MASS INDEX DOCD: CPT | Mod: S$GLB,,, | Performed by: INTERNAL MEDICINE

## 2018-02-20 PROCEDURE — 82728 ASSAY OF FERRITIN: CPT

## 2018-02-20 PROCEDURE — 82306 VITAMIN D 25 HYDROXY: CPT

## 2018-02-20 PROCEDURE — 83735 ASSAY OF MAGNESIUM: CPT

## 2018-02-20 PROCEDURE — 84443 ASSAY THYROID STIM HORMONE: CPT

## 2018-02-20 PROCEDURE — 85025 COMPLETE CBC W/AUTO DIFF WBC: CPT

## 2018-02-20 PROCEDURE — 82784 ASSAY IGA/IGD/IGG/IGM EACH: CPT

## 2018-02-20 PROCEDURE — 83516 IMMUNOASSAY NONANTIBODY: CPT

## 2018-02-20 PROCEDURE — 36415 COLL VENOUS BLD VENIPUNCTURE: CPT

## 2018-02-20 RX ORDER — ASCORBIC ACID 125 MG
TABLET,CHEWABLE ORAL 2 TIMES DAILY
COMMUNITY

## 2018-02-20 NOTE — PROGRESS NOTES
CHIEF COMPLAINT:  Diarrhea and bloating.    HISTORY OF PRESENT ILLNESS:  Kathleen is a 37-year-old white female who would like   to be evaluated for possible inflammatory bowel disease.  She said she started   with perianal abscess in may be April 2017.  Originally, they thought it may be   a vaginal abscess, but after surgery for that, it never got better.  She   underwent evaluation by Colon and Rectal Surgery.  It is much better, but they   still plan to do a definitive repair, but they would like evaluation with an EGD   and colonoscopy to rule out Crohn's or inflammatory bowel disease first.  The   patient says her sister has an allergy to celiac sprue.  She is not sure if she   has ever been tested.  She states that a long time ago in high school, she was   having GI symptoms in college, maybe got worked up for wheat intolerance and   dairy intolerance, but since the birth of her child over 9 years ago, she has   been on a regular diet, eating wheat, dairy as well as gluten.    REVIEW OF SYSTEMS:  CONSTITUTIONAL:  No fever or chills.  She does have chronic fatigue.  No weight   loss.  EYES:  No visual disturbances.  ENT:  No difficulty swallowing.  No sore throat.  No hoarseness.  No dysphagia.    No odynophagia.  CARDIOVASCULAR:  No chest pain or palpitations.  RESPIRATORY:  No shortness of breath or cough.  GENITOURINARY:  No dysuria, urgency or frequency.  MUSCULOSKELETAL:  Some arthritis in her hands.  She has been taking Aleve pills   1 or 2 pills basically daily for many, many years, not taking a PPI.  SKIN:  No itching or rash.  NEUROLOGIC:  No headache, syncope or stroke.  PSYCHIATRIC:  No uncontrolled depression or anxiety, but she does have anxiety.  ENDOCRINE:  No cold or heat intolerance.  LYMPHATICS:  No lymphadenopathy.  GASTROINTESTINAL:  Occasional nausea.  No vomiting.  No heartburn.  No abdominal   pain.  Occasionally, gas and bloating.  No early satiety.  No blood in her   stool.  She averages  1 bowel movement a day.  Sometimes, she has loose bowel   movements.  No recent antibiotic use.    ALLERGIES:  She is allergic to tree nuts and steroids.    RISK FACTORS FOR LIVER DISEASE:  No blood transfusion.  No IV drugs.  No   tattoos.  No nasal drug use.    PAST MEDICAL HISTORY:  Negative for diabetes, heart disease, lung disease, renal   disease, hypertension, hyperlipidemia, cancer or jaundice.    PAST SURGICAL HISTORY:  She had a perianal abscess removed, endometriosis   surgery, C-sections x2 and then got the  scar cosmetically revised.    FAMILY HISTORY:  Her dad is healthy at 58 years old.  Mom is 62 and healthy.    Nobody with colon cancer.  Nobody with advanced adenomatous colon polyps.    Nobody with FAP, attenuated FAP, MAP or Arteaga syndrome.  Sister is possibly with   celiac sprue.  Breast cancer in dad's sister at age 40.  Nobody else with   breast cancer.  Nobody with chronic hepatitis, cirrhosis of the liver, liver   cancer, pancreatitis or pancreatic cancer.  Nobody with inflammatory bowel   disease.    SOCIAL HISTORY:  A nonsmoker.  Does drink alcohol in moderation.  She is a CPA.    She is on her first marriage for 10 years.  Her  does commercial real   estate.  They have 2 healthy kids, a 9-year-old son and a 6-year old son at   [employer] the oldest one.  The youngest one has MCM birth defect.    PHYSICAL EXAMINATION:  VITAL SIGNS:  With chaperone in the room, Mela, blood pressure is 132/87,   pulse is 64, she is 5 feet 5 inches tall, 184 pounds and BMI is 30.67.  GENERAL:  She is alert and oriented x4, not in any acute distress.  ABDOMEN:  Slightly obese, but soft.  No guarding.  No rebound.  No tenderness.    No palpable organomegaly.  No bruits.  No pulsatile masses.  No stigmata of   chronic liver disease.  No appreciative ascites or hernias.  Normoactive bowel   signs.  No Quintana sign.  No McBurney point tenderness.  No CVA tenderness.  EYES:  Conjunctivae are  nonicteric.  Oral cavity is clear without lesions or   masses.  NECK:  Questionable enlarged thyroid, but not certain.  CARDIOVASCULAR:  S1 and S2 without murmurs, gallops or rubs.  RESPIRATORY:  Clear to auscultation bilaterally without wheezes, rhonchi or   rales.  SKIN:  No petechiae or rash on exposed skin areas.  NEUROLOGIC:  Alert and oriented x4.  PSYCHIATRIC:  Normal speech, mentation and affect.  LYMPHATICS:  No cervical or supraclavicular lymphadenopathy.    MEDICAL DECISION MAKING:  Lactose breath test given.  Stool study test   discussed.  The patient requests an Endocrinology referral.  EGD and colonoscopy   talk given.    IMPRESSION AND PLAN:  1.  Chronic diarrhea and intermittent bloating.  We will do stool studies.  We   will do EGD and colonoscopy and lactose breath test.  We recommend the patient   return to GI Clinic in 3 months for followup.  2.  Questionable enlarged thyroid.  We will get thyroid ultrasound and refer to   Endocrine.  3.  Family history of celiac sprue in a sister.  We will get celiac sprue   serologies as well as EGD.      SEC/IN  dd: 02/20/2018 17:49:44 (CST)  td: 02/21/2018 15:20:26 (CST)  Doc ID   #[id number]  Job ID #[id number]    CC:

## 2018-02-23 LAB — TTG IGA SER IA-ACNC: 5 UNITS

## 2018-03-10 DIAGNOSIS — E61.1 IRON DEFICIENCY: ICD-10-CM

## 2018-03-10 DIAGNOSIS — R79.89 LFT ELEVATION: Primary | ICD-10-CM

## 2018-03-10 RX ORDER — FERROUS SULFATE 325(65) MG
325 TABLET ORAL EVERY 12 HOURS
Qty: 60 TABLET | Refills: 4 | Status: SHIPPED | OUTPATIENT
Start: 2018-03-10

## 2018-03-14 ENCOUNTER — TELEPHONE (OUTPATIENT)
Dept: GASTROENTEROLOGY | Facility: CLINIC | Age: 38
End: 2018-03-14

## 2018-03-14 NOTE — TELEPHONE ENCOUNTER
----- Message from Michael Mckinney MD sent at 3/10/2018  5:01 PM CST -----  Mela - please tell patient that they are iron deficient but anaemic and recommend that they take ferrous sulfate one 325mg pill every 12 hours for next 4 months and repeat fasting hemoglobin and Ferritin in 8 weeks - Orders placed.    Susan please schedule your EGD and colonoscopy.    Mela - please order Fasting labs in 2 weeks orders placed

## 2018-03-19 ENCOUNTER — OFFICE VISIT (OUTPATIENT)
Dept: SURGERY | Facility: CLINIC | Age: 38
End: 2018-03-19
Payer: COMMERCIAL

## 2018-03-19 VITALS
HEART RATE: 70 BPM | SYSTOLIC BLOOD PRESSURE: 126 MMHG | WEIGHT: 184.5 LBS | BODY MASS INDEX: 30.74 KG/M2 | HEIGHT: 65 IN | DIASTOLIC BLOOD PRESSURE: 64 MMHG

## 2018-03-19 DIAGNOSIS — K64.8 INTERNAL HEMORRHOIDS WITH COMPLICATION: ICD-10-CM

## 2018-03-19 DIAGNOSIS — Z09 POSTOP CHECK: Primary | ICD-10-CM

## 2018-03-19 PROCEDURE — 99999 PR PBB SHADOW E&M-EST. PATIENT-LVL III: CPT | Mod: PBBFAC,,, | Performed by: COLON & RECTAL SURGERY

## 2018-03-19 PROCEDURE — 99024 POSTOP FOLLOW-UP VISIT: CPT | Mod: S$GLB,,, | Performed by: COLON & RECTAL SURGERY

## 2018-03-19 NOTE — PROGRESS NOTES
Subjective:       Patient ID: Susan Anaya is a 37 y.o. female.    Chief Complaint: Hemorrhoids    HPI Established pt. Doing better.  Seen by alternative MD who started on Paleo diet and has had less problems with loose stool. No pain. No bleeding.  Some incontinence to flatus when walking.    Overall feeling better.     Review of Systems   Constitutional: Negative for chills and fever.   Respiratory: Negative for cough and shortness of breath.    Cardiovascular: Negative for chest pain and palpitations.   Gastrointestinal: Negative for nausea and vomiting.   Genitourinary: Negative for dysuria and urgency.   Neurological: Negative for seizures and numbness.       Objective:      Physical Exam   Constitutional: She is oriented to person, place, and time. She appears well-developed and well-nourished.   Eyes: Conjunctivae and EOM are normal.   Pulmonary/Chest: Effort normal. No respiratory distress.   Abdominal: Soft. She exhibits no distension.   Genitourinary:   Genitourinary Comments: Anorectal: Rt anterolateral anus - healed, scaring.  RPL mixed internal external hemorrhoid.       Musculoskeletal: Normal range of motion. She exhibits no edema.   Neurological: She is alert and oriented to person, place, and time.   Skin: Skin is warm and dry.   Psychiatric: She has a normal mood and affect. Her behavior is normal.       Assessment:       1. Postop check    2. Internal hemorrhoids with complication        Plan:       Continue current dietary management.   F/u prn.  Would consider RBL of RPL hemorrhoid.

## 2021-04-01 NOTE — TELEPHONE ENCOUNTER
Left message on patient's voicemail in regard to fever.  According to chart, patient is in the ER.  
Plan: *patient declines prescription medication to be sent to pharmacy*
Detail Level: Zone
Render In Strict Bullet Format?: No
Initiate Treatment: *Recommend OTC Amlactin QD

## 2023-04-25 NOTE — PRE-PROCEDURE INSTRUCTIONS
Phone call to pt today with pre-op instructions given including NPO after MN, medications to take/hold the morning of surgery, arrival procedure and location, shower with antibacterial soap. Pts questions answered and concerns addressed; verbalized understanding.     89.4

## (undated) DEVICE — GLOVE BIOGEL SKINSENSE PI 7.0

## (undated) DEVICE — SEE MEDLINE ITEM 146417

## (undated) DEVICE — SUT VICRYL 4-0 27 SH

## (undated) DEVICE — SEE MEDLINE ITEM 146313

## (undated) DEVICE — GAUZE SPONGE 4X4 12PLY

## (undated) DEVICE — LUBRICANT SURGILUBE 2 OZ

## (undated) DEVICE — SEE MEDLINE ITEM 157148

## (undated) DEVICE — ELECTRODE REM PLYHSV RETURN 9

## (undated) DEVICE — SOL PVP-I SCRUB 7.5% 4OZ

## (undated) DEVICE — GLOVE BIOGEL SKINSENSE PI 7.5

## (undated) DEVICE — GOWN SMART IMP BREATHABLE XXLG

## (undated) DEVICE — UNDERGLOVE BIOGEL PI SZ 6.5 LF

## (undated) DEVICE — DRESSING GAUZE 6PLY 4X4

## (undated) DEVICE — APPLICATOR COTTON TIP 6IN STRL

## (undated) DEVICE — GLOVE BIOGEL SKINSENSE PI 6.5

## (undated) DEVICE — ELECTRODE NEEDLE 1IN

## (undated) DEVICE — SUT VICRYL 3-0 27 SH

## (undated) DEVICE — NDL HYPO A BEVEL 22X1 1/2

## (undated) DEVICE — SUT VICRYL+ 2-0 UR6 27 VIOL

## (undated) DEVICE — TRAY MINOR GEN SURG

## (undated) DEVICE — SOL 9P NACL IRR PIC IL

## (undated) DEVICE — SYR ONLY LUER LOCK 20CC

## (undated) DEVICE — BRIEF MESH LARGE

## (undated) DEVICE — SEE MEDLINE ITEM 152622

## (undated) DEVICE — SUT CHROMIC 3-0 SH 27IN GUT

## (undated) DEVICE — SOL BETADINE 5%

## (undated) DEVICE — PENCIL ELECTROSURG HOLST W/BLD

## (undated) DEVICE — TRAY DRY SKIN SCRUB PREP

## (undated) DEVICE — Device

## (undated) DEVICE — SEE MEDLINE ITEM 153151

## (undated) DEVICE — SEE MEDLINE ITEM 157196

## (undated) DEVICE — SEE MEDLINE ITEM 146347

## (undated) DEVICE — SPONGE GAUZE 16PLY 4X4

## (undated) DEVICE — SEE MEDLINE ITEM 157110